# Patient Record
Sex: MALE | Race: BLACK OR AFRICAN AMERICAN | NOT HISPANIC OR LATINO | Employment: OTHER | URBAN - METROPOLITAN AREA
[De-identification: names, ages, dates, MRNs, and addresses within clinical notes are randomized per-mention and may not be internally consistent; named-entity substitution may affect disease eponyms.]

---

## 2017-01-11 ENCOUNTER — HOSPITAL ENCOUNTER (EMERGENCY)
Facility: MEDICAL CENTER | Age: 62
End: 2017-01-12
Attending: EMERGENCY MEDICINE
Payer: MEDICAID

## 2017-01-11 DIAGNOSIS — F29 PSYCHOSIS, UNSPECIFIED PSYCHOSIS TYPE (HCC): ICD-10-CM

## 2017-01-11 DIAGNOSIS — F15.10 METHAMPHETAMINE ABUSE (HCC): ICD-10-CM

## 2017-01-11 LAB
AMPHETAMINES UR QL: POSITIVE
BARBITURATES UR QL SCN: NEGATIVE
BENZODIAZ UR QL SCN: NEGATIVE
BZE UR QL SCN: NEGATIVE
ETHANOL BLD-MCNC: 0 G/DL
PCP UR QL SCN: NEGATIVE
UR OPIATES 2659: NEGATIVE
UR THC 2511T: NEGATIVE
UR TRICYCLIC 2660: NEGATIVE

## 2017-01-11 PROCEDURE — 51701 INSERT BLADDER CATHETER: CPT

## 2017-01-11 PROCEDURE — 51702 INSERT TEMP BLADDER CATH: CPT

## 2017-01-11 PROCEDURE — 80305 DRUG TEST PRSMV DIR OPT OBS: CPT

## 2017-01-11 PROCEDURE — 80307 DRUG TEST PRSMV CHEM ANLYZR: CPT

## 2017-01-11 PROCEDURE — 96374 THER/PROPH/DIAG INJ IV PUSH: CPT

## 2017-01-11 PROCEDURE — 99285 EMERGENCY DEPT VISIT HI MDM: CPT

## 2017-01-11 PROCEDURE — 96372 THER/PROPH/DIAG INJ SC/IM: CPT

## 2017-01-11 PROCEDURE — 700111 HCHG RX REV CODE 636 W/ 250 OVERRIDE (IP): Performed by: EMERGENCY MEDICINE

## 2017-01-11 PROCEDURE — 303105 HCHG CATHETER EXTRA

## 2017-01-11 RX ORDER — ZIPRASIDONE HYDROCHLORIDE 20 MG/1
20 CAPSULE ORAL
Status: DISCONTINUED | OUTPATIENT
Start: 2017-01-11 | End: 2017-01-12 | Stop reason: HOSPADM

## 2017-01-11 RX ORDER — HALOPERIDOL 5 MG/ML
10 INJECTION INTRAMUSCULAR ONCE
Status: DISCONTINUED | OUTPATIENT
Start: 2017-01-11 | End: 2017-01-12

## 2017-01-11 RX ORDER — LORAZEPAM 2 MG/ML
2 INJECTION INTRAMUSCULAR ONCE
Status: COMPLETED | OUTPATIENT
Start: 2017-01-11 | End: 2017-01-11

## 2017-01-11 RX ORDER — HALOPERIDOL 5 MG/ML
10 INJECTION INTRAMUSCULAR ONCE
Status: COMPLETED | OUTPATIENT
Start: 2017-01-11 | End: 2017-01-11

## 2017-01-11 RX ORDER — LORAZEPAM 2 MG/ML
2 INJECTION INTRAMUSCULAR ONCE
Status: DISCONTINUED | OUTPATIENT
Start: 2017-01-11 | End: 2017-01-12

## 2017-01-11 RX ADMIN — LORAZEPAM 2 MG: 2 INJECTION INTRAMUSCULAR; INTRAVENOUS at 20:00

## 2017-01-11 RX ADMIN — HALOPERIDOL LACTATE 10 MG: 5 INJECTION, SOLUTION INTRAMUSCULAR at 20:00

## 2017-01-11 NOTE — ED AVS SNAPSHOT
After Visit Summary                                                                                                                Regino Robin   MRN: [id number]    Department:  [facility], Emergency Dept   Date of Visit:  1/11/2017            [facility], Emergency Dept    [address]    [place] [address]    Phone:  [phone]      You were seen by     1. Pratik Jones M.D.    2. Neeru Lilly M.D.    3. Bernabe Mcdaniels D.O.      Your Diagnosis Was     Psychosis, unspecified psychosis type     F29       These are the medications you received during your hospitalization from 01/11/2017 1912 to 01/12/2017 1325     Date/Time Order Dose Route Action    01/11/2017 2000 haloperidol lactate (HALDOL) injection 10 mg 10 mg Intramuscular Given    01/11/2017 2000 lorazepam (ATIVAN) injection 2 mg 2 mg Intravenous Given      Follow-up Information     1. Follow up with [facility], Emergency Dept.    Specialty:  Emergency Medicine    Why:  If symptoms worsen    Contact information    [address] [address] [phone]        2. Schedule an appointment as soon as possible for a visit with Pcp Pt States None.    Specialty:  Family Medicine      Medication Information     Review all of your home medications and newly ordered medications with your primary doctor and/or pharmacist as soon as possible. Follow medication instructions as directed by your doctor and/or pharmacist.     Please keep your complete medication list with you and share with your physician. Update the information when medications are discontinued, doses are changed, or new medications (including over-the-counter products) are added; and carry medication information at all times in the event of emergency situations.               Medication List      Notice     You have not been prescribed any medications.            Procedures and tests performed  during your visit     DIAGNOSTIC ALCOHOL (BA)    UR DRUG SCREEN(SO BANEGAS ONLY)        Discharge Instructions       Psychosis  Psychosis refers to a severe loss of contact with reality. During a psychotic episode, a person is not able to think clearly, and his or her emotions and responses do not match up with what is actually happening. Someone may have false beliefs about what is happening or who they are (delusions). Someone may see, hear, taste, smell, or feel things that are not present (hallucinations).   Psychosis usually occurs with very serious mental health (psychiatric) conditions such as schizophrenia, bipolar disorder, or major depression. It can sometimes also be the result of drug use or certain medical conditions.  SYMPTOMS  Symptoms of a psychotic episode include:  · Delusions, such as:  · Feeling excessive fear or suspicion (paranoia).  · Believing something that is odd, unrealistic, or false, such as having a false belief about being someone else.  · Hallucinations.  · Disorganized thinking, such as thoughts that jump from one to another that do not make sense to others.  · Disorganized speech, such as saying things that do not make sense to others.  · Inappropriate behavior, such as talking to oneself or intruding on unfamiliar people.  DIAGNOSIS  A diagnosis of psychosis is made through an assessment by a health care provider, who will ask questions about thoughts, feelings, behavior, drug use, and medical conditions. The health care provider may also do one or more of the following:  · Physical exam.  · Blood tests.  · Brain imaging, such as a CT scan or MRI.  · Brain wave study (EEG).  The health care provider may make a referral for further evaluation by a mental health professional.  TREATMENT   Treatment depends on the cause of the psychosis. Treatment may include one or more of the following:  · Monitoring and supportive care in the emergency room or hospital.    · Taking medicines  (antipsychotic medicine) to reduce symptoms and to balance chemicals in the brain.  · Treating an underlying medical condition.  · Stopping or reducing drugs that are causing psychosis.  · Therapy and other supportive programs outside of the hospital.  HOME CARE INSTRUCTIONS  · Over-the-counter and prescription medicines should be taken only as told by the health care provider.  · The health care provider should be consulted before over-the-counter medicines, herbs, or supplements are used.  · All follow-up visits should be kept as told by the health care provider. This is important.  · A healthy lifestyle should be maintained. This includes:  · Eating a healthy diet.  · Getting enough sleep.  · Exercising regularly.  · Avoiding alcohol and recreational drugs as told by the health care provider.  SEEK MEDICAL CARE IF:  · Medicines do not seem to be helping.  · The person hears voices telling him or her to do things.  · The person continues to see, smell, or feel things that are not there.  · The person feels extremely fearful and suspicious that someone or something will harm him or her.  · The person feels unable to leave his or her house.  · The person has trouble taking care of himself or herself.  · The person experiences side effects of medicines, such as:  · Changes in sleep patterns.  · Dizziness.  · Weight gain.  · Restlessness.  · Movement changes.  · Muscle spasms.  · Tremors.  SEEK IMMEDIATE MEDICAL CARE IF:  · Serious thoughts occur about self-harm or about hurting others.  · There are serious side effects of medicine, such as:  · Swelling of the face, lips, tongue, or throat.  · Fever, confusion, muscle spasms, or seizures.     This information is not intended to replace advice given to you by your health care provider. Make sure you discuss any questions you have with your health care provider.     Document Released: 06/07/2011 Document Revised: 05/03/2016 Document Reviewed: 12/22/2015  [employer]  Interactive Patient Education ©2016 Elsevier Inc.    Amphetamine Abuse  Meth and other amphetamines over-stimulate the nervous system. This gives you a false feeling of power and confidence. Amphetamines once came in the form of diet pills. This is no longer considered a valid reason to use the drug. More often they are bought as the illegal drug, methamphetamine. It is also known as crank, crystal, speed, or ice. Meth and similar drugs can cause a variety of problems. They can cause severe physical and psychological problems.  SYMPTOMS   · Reduced appetite.  · Dry mouth.  · Erectile dysfunction.  · Headache.  · Fever and sweating.  · Diarrhea or constipation.  · Blurred vision and impaired speech.  · Dizziness, uncontrollable movements or shaking.  · Restlessness.  · Palpitations and irregular heartbeat.  · Anxiety and/or general nervousness.  Long-term problems:  · Convulsions.  · Heart attack.  · Poor skin color.  · A mental state that mimics serious psychiatric illness.  · Emotional instability.  · Aggression.  · Dry or itchy skin.  · Acne.  RISKS AND COMPLICATIONS  Risks associated with needle use and inhaling include:  · Infection.  · Vein damage.  · Overdose.  · Skin abscesses.  · Hepatitis.  · AIDS.  TREATMENT   There are 2 types:   · Short-term medical treatment. This helps to preserve life and prevent or minimize damage from the problems described above.  · Long-term substance abuse treatment. This helps to achieve recovery from drug abuse or addiction.  HOME CARE INSTRUCTIONS   After treatment discharge, it is essential to do the following:  · Follow all instructions from your caregiver very carefully.  · Take all medications as prescribed. If you cannot, contact your caregiver right away.  · Keep all appointments for further evaluation and counseling.  · Do not use drugs, alcohol or any other mind and mood altering drugs unless prescribed by your doctor.  · Do not operate a motor vehicle or machinery  until your caregiver says it is OK.  SEEK IMMEDIATE MEDICAL CARE IF:   · You have a seizure (convulsions).  · You become very shaky or tense.  · You become light headed or faint.  · You notice sudden or gradual weakness on one side of the body or an arm or leg, or are unable to walk.  · You have a sudden, severe headache, blurred vision or high fever.  · You develop chest pain, nausea or vomiting.  If you have any of the above symptoms, DO NOT DRIVE. Have someone else drive you or call your local emergency services (911 in [place] for help.  Document Released: 01/25/2006 Document Revised: 03/11/2013 Document Reviewed: 03/15/2011  [employer]® Patient Information ©2014 [employer].            Patient Information     Patient Information    Following emergency treatment: all patient requiring follow-up care must return either to a private physician or a clinic if your condition worsens before you are able to obtain further medical attention, please return to the emergency room.     Billing Information    At [facility], we work to make the billing process streamlined for our patients.  Our Representatives are here to answer any questions you may have regarding your hospital bill.  If you have insurance coverage and have supplied your insurance information to us, we will submit a claim to your insurer on your behalf.  Should you have any questions regarding your bill, we can be reached online or by phone as follows:  Online: You are able pay your bills online or live chat with our representatives about any billing questions you may have. We are here to help Monday - Friday from 8:00am to 7:30pm and 9:00am - 12:00pm on Saturdays.  Please visit [email or web address]  for more information.   Phone:  [phone] or [phone]    Please note that your emergency physician, surgeon, pathologist, radiologist, anesthesiologist, and other specialists are not employed by [facility] and will  therefore bill separately for their services.  Please contact them directly for any questions concerning their bills at the numbers below:     Emergency Physician Services:  [phone]  [facility]:  [phone]  [facility]:  [phone]  [facility]:  [phone]    1. Your final bill may vary from the amount quoted upon discharge if all procedures are not complete at that time, or if your doctor has additional procedures of which we are not aware. You will receive an additional bill if you return to the Emergency Department at [facility] for suture removal regardless of the facility of which the sutures were placed.     2. Please arrange for settlement of this account at the emergency registration.    3. All self-pay accounts are due in full at the time of treatment.  If you are unable to meet this obligation then payment is expected within 4-5 days.     4. If you have had radiology studies (CT, X-ray, Ultrasound, MRI), you have received a preliminary result during your emergency department visit. Please contact the radiology department [phone] to receive a copy of your final result. Please discuss the Final result with your primary physician or with the follow up physician provided.     Crisis Hotline:  [place] Crisis Hotline:  6-539-PVBELIV or 1-937.471.4833  [place] Crisis Hotline:    1-810.506.1879 or 615-130-6968         ED Discharge Follow Up Questions    1. In order to provide you with very good care, we would like to follow up with a phone call in the next few days.  May we have your permission to contact you?     YES /  NO    2. What is the best phone number to call you? (       )_____-__________    3. What is the best time to call you?      Morning  /  Afternoon  /  Evening                   Patient Signature:  ____________________________________________________________    Date:  ____________________________________________________________

## 2017-01-11 NOTE — ED AVS SNAPSHOT
1/12/2017          Regino Robin  [address] [place] [address]    Dear Regino:    [facility] wants to ensure your discharge home is safe and you or your loved ones have had all your questions answered regarding your care after you leave the hospital.    You may receive a telephone call within two days of your discharge.  This call is to make certain you understand your discharge instructions as well as ensure we provided you with the best care possible during your stay with us.     The call will only last approximately 3-5 minutes and will be done by a nurse.    Once again, we want to ensure your discharge home is safe and that you have a clear understanding of any next steps in your care.  If you have any questions or concerns, please do not hesitate to contact us, we are here for you.  Thank you for choosing [facility] for your healthcare needs.    Sincerely,    Silvio Vicente    [facility]

## 2017-01-11 NOTE — ED AVS SNAPSHOT
[employer] Access Code: [email or web address]  Expires: 1/17/2017 12:09 PM    Your email address is not on file at [employer].  Email Addresses are required for you to sign up for [employer], please contact [phone] to verify your personal information and to provide your email address prior to attempting to register for [employer].    Regino Robin  [address] [place] [address]    [facility] [employer]  A secure, online tool to manage your health information     [employer]’s [employer]® is a secure, online tool that connects you to your personalized health information from the privacy of your home -- day or night - making it very easy for you to manage your healthcare. Once the activation process is completed, you can even access your medical information using the [employer] praneeth, which is available for free in the Apple Praneeth store or Google Play store.     To learn more about [employer], visit [email or web address]    There are two levels of access available (as shown below):   My Chart Features  [facility] Primary Care Doctor [facility]  Specialists [facility]  Urgent  Care Non-[facility] Primary Care Doctor   Email your healthcare team securely and privately 24/7 X X X    Manage appointments: schedule your next appointment; view details of past/upcoming appointments X      Request prescription refills. X      View recent personal medical records, including lab and immunizations X X X X   View health record, including health history, allergies, medications X X X X   Read reports about your outpatient visits, procedures, consult and ER notes X X X X   See your discharge summary, which is a recap of your hospital and/or ER visit that includes your diagnosis, lab results, and care plan X X  X     How to register for [employer]:  Once your e-mail address has been verified, follow the following steps to sign up for [employer].     1. Go to  [email or web address]  2. Click on the Sign Up Now box, which takes you to the New Member Sign Up page. You will need to  provide the following information:  a. Enter your [employer] Access Code exactly as it appears at the top of this page. (You will not need to use this code after you’ve completed the sign-up process. If you do not sign up before the expiration date, you must request a new code.)   b. Enter your date of birth.   c. Enter your home email address.   d. Click Submit, and follow the next screen’s instructions.  3. Create a [employer] ID. This will be your [employer] login ID and cannot be changed, so think of one that is secure and easy to remember.  4. Create a [employer] password. You can change your password at any time.  5. Enter your Password Reset Question and Answer. This can be used at a later time if you forget your password.   6. Enter your e-mail address. This allows you to receive e-mail notifications when new information is available in [employer].  7. Click Sign Up. You can now view your health information.    For assistance activating your [employer] account, call [phone]

## 2017-01-12 VITALS
RESPIRATION RATE: 18 BRPM | WEIGHT: 145 LBS | SYSTOLIC BLOOD PRESSURE: 107 MMHG | TEMPERATURE: 99.2 F | BODY MASS INDEX: 18.12 KG/M2 | OXYGEN SATURATION: 100 % | HEART RATE: 84 BPM | DIASTOLIC BLOOD PRESSURE: 76 MMHG

## 2017-01-12 NOTE — ED PROVIDER NOTES
Pt was signed out to me h/o possible schizophrenia and TBI and methamphetamine abuse.   He was on a busy being psychotic received haldol and ativan     Pending medical clearance, labs pending  utox and BAL pending     Follow up and re-eval is the plan at this time     12:19 AM  Utox + amphetamines.    I went and evaluated the patient he is currently resting in 4 point restraints after being chemically restrained. Per reports he is being very combative and aggressive and was a danger to staff which is why he was placed in 4. restraints. Staff is aware that they will change positions every couple hours and at this time until he metabolizes his amphetamines further we'll continue to watch him and we'll reevaluate his psychosis in the morning. He continues to be psychotic we will consult by skills in the morning.

## 2017-01-12 NOTE — ED NOTES
Med rec complete per patient.  Patient denies taking prescription/ OTC medications.  Patient denies taking antibiotics within last month.  Allergies reviewed.

## 2017-01-12 NOTE — ED NOTES
to bedside [employer] provided VSS D/C instn reviewed F/U clinic prn D/C ambul Stable improved condn

## 2017-01-12 NOTE — ED PROVIDER NOTES
ED Provider Note    CHIEF COMPLAINT  Chief Complaint   Patient presents with   • Psych Eval   • Hallucinations   • Schizophrenia       HPI  Regino Robin is a 61 y.o. male who presents for psychiatric evaluation. Patient was brought in by EMS when he approached the  and was acting confused. Here in the emergency department he standing out in all screaming I don't want to die. He states he is being followed. And he states that people are trying to kill him. He denies psychiatric history although he has been at this hospital before for chronic pain management, neuropathy, and methamphetamine induced psychosis. No further details of history of present illness could be obtained within the constraints of the urgency of the patient's clavicle condition.    REVIEW OF SYSTEMS  See HPI for further details. No fever or chills. No chest pain. No cough or cold symptoms. No vomiting or diarrhea. All other systems are negative    PAST MEDICAL HISTORY  No past medical history on file.    FAMILY HISTORY  No family history on file.    SOCIAL HISTORY  Social History     Social History   • Marital Status: Single     Spouse Name: N/A   • Number of Children: N/A   • Years of Education: N/A     Social History Main Topics   • Smoking status: Current Every Day Smoker   • Smokeless tobacco: Not on file   • Alcohol Use: Yes   • Drug Use: No   • Sexual Activity: Not on file     Other Topics Concern   • Not on file     Social History Narrative       SURGICAL HISTORY  Past Surgical History   Procedure Laterality Date   • Splenectomy     • Other orthopedic surgery       rods in both legs       CURRENT MEDICATIONS  Home Medications     Reviewed by Blu Mcclendon R.N. (Registered Nurse) on 01/11/17 at 1931  Med List Status: Not Addressed    Medication Last Dose Status          Patient Nilesh Taking any Medications                        ALLERGIES  No Known Allergies    PHYSICAL EXAM  VITAL SIGNS: /99 mmHg  Pulse 94  Temp(Src)  37.3 °C (99.2 °F)  Resp 19  Wt 65.772 kg (145 lb)  SpO2 98%  Constitutional: Screening in the hallway. Yelling at staff.  HENT: Normocephalic, Atraumatic, Bilateral external ears normal, Oropharynx moist,   Eyes: PERRLA,   Neck: Normal range of motion, No tenderness, Supple, No stridor.   Cardiovascular: Normal heart rate, Normal rhythm, No murmurs, No rubs, No gallops.   Thorax & Lungs: Normal breath sounds.  Abdomen: Bowel sounds normal, Soft, No tenderness, No masses, No pulsatile masses.    Skin: Warm, Dry, No erythema, No rash.   Extremities: No edema, No tenderness, No cyanosis, No clubbing. Dorsalis pedis pulses 2+ equal bilaterally. Radial pulses 2+ equal bilaterally  Neurologic: Ambulatory. Normal strength and sensation  Psychiatric: Screaming. Floridly psychotic. Now resting comfortably        RADIOLOGY/PROCEDURES  Blood alcohol and urine tox is currently pending    COURSE & MEDICAL DECISION MAKING  Pertinent Labs & Imaging studies reviewed. (See chart for details)  Patient was at risk to both himself and the staff. He was given Haldol and Ativan IM. Disposition is currently pending. It is unclear with a not this is methamphetamine-induced psychosis versus organic mental illness.        FINAL IMPRESSION  1. Psychosis  2.   3.        Electronically signed by: Pratik Jones, 1/11/2017 10:01 PM

## 2017-01-12 NOTE — ED NOTES
"ERP in to assess patient.  Patient states that he wants to remain in 4 points until he \"wakes up a little more\".  "

## 2017-01-12 NOTE — ED PROVIDER NOTES
ED Provider Note    Addendum:    Reevaluated the patient at 12:55 PM. The patient is alert and oriented ×4, denies any suicidal or homicidal ideation, he is eating without difficulty, ambulate without difficulty. I do believe the patient had acute psychotic break secondary to his methamphetamine intoxication. The patient was given strict return precautions and I have DC'd his legal 2000.

## 2017-01-12 NOTE — ED NOTES
2 point restraints removed from patient. Plan of care discussed with pt. Pt appears groggy, mumbled understanding. ER Tech remains at bedside. Breakfast at side. Side rails up x2.

## 2017-01-12 NOTE — ED NOTES
Patient was riding the bus and approached the  stating that he was hearing voices saying that people want to kill him.  Patient is very suspicious of staff, questioning our movements stating that we are trying to kill him.  Patient was reassured but remains very tense.  His personal belongings were removed but the patient refuses to take off his jeans, socks and t-shirt.

## 2017-01-12 NOTE — DISCHARGE INSTRUCTIONS
Psychosis  Psychosis refers to a severe loss of contact with reality. During a psychotic episode, a person is not able to think clearly, and his or her emotions and responses do not match up with what is actually happening. Someone may have false beliefs about what is happening or who they are (delusions). Someone may see, hear, taste, smell, or feel things that are not present (hallucinations).   Psychosis usually occurs with very serious mental health (psychiatric) conditions such as schizophrenia, bipolar disorder, or major depression. It can sometimes also be the result of drug use or certain medical conditions.  SYMPTOMS  Symptoms of a psychotic episode include:  · Delusions, such as:  · Feeling excessive fear or suspicion (paranoia).  · Believing something that is odd, unrealistic, or false, such as having a false belief about being someone else.  · Hallucinations.  · Disorganized thinking, such as thoughts that jump from one to another that do not make sense to others.  · Disorganized speech, such as saying things that do not make sense to others.  · Inappropriate behavior, such as talking to oneself or intruding on unfamiliar people.  DIAGNOSIS  A diagnosis of psychosis is made through an assessment by a health care provider, who will ask questions about thoughts, feelings, behavior, drug use, and medical conditions. The health care provider may also do one or more of the following:  · Physical exam.  · Blood tests.  · Brain imaging, such as a CT scan or MRI.  · Brain wave study (EEG).  The health care provider may make a referral for further evaluation by a mental health professional.  TREATMENT   Treatment depends on the cause of the psychosis. Treatment may include one or more of the following:  · Monitoring and supportive care in the emergency room or hospital.    · Taking medicines (antipsychotic medicine) to reduce symptoms and to balance chemicals in the brain.  · Treating an underlying medical  condition.  · Stopping or reducing drugs that are causing psychosis.  · Therapy and other supportive programs outside of the hospital.  HOME CARE INSTRUCTIONS  · Over-the-counter and prescription medicines should be taken only as told by the health care provider.  · The health care provider should be consulted before over-the-counter medicines, herbs, or supplements are used.  · All follow-up visits should be kept as told by the health care provider. This is important.  · A healthy lifestyle should be maintained. This includes:  · Eating a healthy diet.  · Getting enough sleep.  · Exercising regularly.  · Avoiding alcohol and recreational drugs as told by the health care provider.  SEEK MEDICAL CARE IF:  · Medicines do not seem to be helping.  · The person hears voices telling him or her to do things.  · The person continues to see, smell, or feel things that are not there.  · The person feels extremely fearful and suspicious that someone or something will harm him or her.  · The person feels unable to leave his or her house.  · The person has trouble taking care of himself or herself.  · The person experiences side effects of medicines, such as:  · Changes in sleep patterns.  · Dizziness.  · Weight gain.  · Restlessness.  · Movement changes.  · Muscle spasms.  · Tremors.  SEEK IMMEDIATE MEDICAL CARE IF:  · Serious thoughts occur about self-harm or about hurting others.  · There are serious side effects of medicine, such as:  · Swelling of the face, lips, tongue, or throat.  · Fever, confusion, muscle spasms, or seizures.     This information is not intended to replace advice given to you by your health care provider. Make sure you discuss any questions you have with your health care provider.     Document Released: 06/07/2011 Document Revised: 05/03/2016 Document Reviewed: 12/22/2015  [employer] Interactive Patient Education ©2016 [employer]    Amphetamine Abuse  Meth and other amphetamines over-stimulate the  nervous system. This gives you a false feeling of power and confidence. Amphetamines once came in the form of diet pills. This is no longer considered a valid reason to use the drug. More often they are bought as the illegal drug, methamphetamine. It is also known as crank, crystal, speed, or ice. Meth and similar drugs can cause a variety of problems. They can cause severe physical and psychological problems.  SYMPTOMS   · Reduced appetite.  · Dry mouth.  · Erectile dysfunction.  · Headache.  · Fever and sweating.  · Diarrhea or constipation.  · Blurred vision and impaired speech.  · Dizziness, uncontrollable movements or shaking.  · Restlessness.  · Palpitations and irregular heartbeat.  · Anxiety and/or general nervousness.  Long-term problems:  · Convulsions.  · Heart attack.  · Poor skin color.  · A mental state that mimics serious psychiatric illness.  · Emotional instability.  · Aggression.  · Dry or itchy skin.  · Acne.  RISKS AND COMPLICATIONS  Risks associated with needle use and inhaling include:  · Infection.  · Vein damage.  · Overdose.  · Skin abscesses.  · Hepatitis.  · AIDS.  TREATMENT   There are 2 types:   · Short-term medical treatment. This helps to preserve life and prevent or minimize damage from the problems described above.  · Long-term substance abuse treatment. This helps to achieve recovery from drug abuse or addiction.  HOME CARE INSTRUCTIONS   After treatment discharge, it is essential to do the following:  · Follow all instructions from your caregiver very carefully.  · Take all medications as prescribed. If you cannot, contact your caregiver right away.  · Keep all appointments for further evaluation and counseling.  · Do not use drugs, alcohol or any other mind and mood altering drugs unless prescribed by your doctor.  · Do not operate a motor vehicle or machinery until your caregiver says it is OK.  SEEK IMMEDIATE MEDICAL CARE IF:   · You have a seizure (convulsions).  · You become  very shaky or tense.  · You become light headed or faint.  · You notice sudden or gradual weakness on one side of the body or an arm or leg, or are unable to walk.  · You have a sudden, severe headache, blurred vision or high fever.  · You develop chest pain, nausea or vomiting.  If you have any of the above symptoms, DO NOT DRIVE. Have someone else drive you or call your local emergency services (911 in [place] for help.  Document Released: 01/25/2006 Document Revised: 03/11/2013 Document Reviewed: 03/15/2011  Z[employer]ge® Patient Information ©2014 [employer].

## 2017-01-12 NOTE — ED NOTES
"Patient is yelling out, \"I don't want to die.\" in the hallway.  ERP aware.  Patient to be placed in 4 points and medicated for safety.  "

## 2017-01-12 NOTE — ED NOTES
Patient released out of his left arm and right foot restraint.  He was also provided with clean linens and stated that he remembered thinking that someone wanted to kill him last night.

## 2017-01-12 NOTE — ED NOTES
Pt finished eating. Tolerated without incident. Pt up ambulatory to bathroom, steady on feet with 0 s/s distress noted. Belongings returned to pt. Social Work called to see about any available resources for pt.

## 2017-01-12 NOTE — ED NOTES
Pt c/o of shoulder discomfort.  Restraints adjusted.  Pt provided with another warm blanket and continues to rest comfortably.

## 2017-01-12 NOTE — ED NOTES
"5 ER staff members and 2 security guards placed patient in 4 point restrains.  ERP at bedside to inform patient of the plan of care.  Patient was scared and apologetic stating that he was sorry for swearing at \"the [employer]\".  He states that the \"herlinda is rich and he put a contract out on me\".   "

## 2017-03-06 PROCEDURE — 99283 EMERGENCY DEPT VISIT LOW MDM: CPT

## 2017-03-07 ENCOUNTER — HOSPITAL ENCOUNTER (EMERGENCY)
Facility: MEDICAL CENTER | Age: 62
End: 2017-03-07
Attending: EMERGENCY MEDICINE
Payer: MEDICAID

## 2017-03-07 VITALS
RESPIRATION RATE: 18 BRPM | WEIGHT: 142 LBS | HEIGHT: 74 IN | HEART RATE: 97 BPM | BODY MASS INDEX: 18.22 KG/M2 | OXYGEN SATURATION: 98 % | SYSTOLIC BLOOD PRESSURE: 124 MMHG | TEMPERATURE: 98.4 F | DIASTOLIC BLOOD PRESSURE: 79 MMHG

## 2017-03-07 DIAGNOSIS — J18.9 PNEUMONIA DUE TO INFECTIOUS ORGANISM, UNSPECIFIED LATERALITY, UNSPECIFIED PART OF LUNG: ICD-10-CM

## 2017-03-07 ASSESSMENT — PAIN SCALES - GENERAL: PAINLEVEL_OUTOF10: 0

## 2017-03-07 NOTE — ED AVS SNAPSHOT
Pathbrite Access Code: [email or web address]  Expires: 4/6/2017  5:04 AM    Your email address is not on file at [facility].  Email Addresses are required for you to sign up for Pathbrite, please contact [phone] to verify your personal information and to provide your email address prior to attempting to register for Pathbrite.    Regino Garrettfield  [address] [place] [address]    Appotat  A secure, online tool to manage your health information     [facility]’s Pathbrite® is a secure, online tool that connects you to your personalized health information from the privacy of your home -- day or night - making it very easy for you to manage your healthcare. Once the activation process is completed, you can even access your medical information using the Pathbrite praneeth, which is available for free in the Apple Praneeth store or Google Play store.     To learn more about Pathbrite, visit [email or web address]    There are two levels of access available (as shown below):   My Chart Features  [facility] Primary Care Doctor [facility]  Specialists [facility]  Urgent  Care Non-[facility] Primary Care Doctor   Email your healthcare team securely and privately 24/7 X X X    Manage appointments: schedule your next appointment; view details of past/upcoming appointments X      Request prescription refills. X      View recent personal medical records, including lab and immunizations X X X X   View health record, including health history, allergies, medications X X X X   Read reports about your outpatient visits, procedures, consult and ER notes X X X X   See your discharge summary, which is a recap of your hospital and/or ER visit that includes your diagnosis, lab results, and care plan X X  X     How to register for Appotat:  Once your e-mail address has been verified, follow the following steps to sign up for Appotat.     1. Go to  [email or web address]  2. Click on the Sign Up Now box, which takes you to the New Member Sign Up page. You will need to  provide the following information:  a. Enter your Pllop.it Access Code exactly as it appears at the top of this page. (You will not need to use this code after you’ve completed the sign-up process. If you do not sign up before the expiration date, you must request a new code.)   b. Enter your date of birth.   c. Enter your home email address.   d. Click Submit, and follow the next screen’s instructions.  3. Create a Pllop.it ID. This will be your Pllop.it login ID and cannot be changed, so think of one that is secure and easy to remember.  4. Create a Pllop.it password. You can change your password at any time.  5. Enter your Password Reset Question and Answer. This can be used at a later time if you forget your password.   6. Enter your e-mail address. This allows you to receive e-mail notifications when new information is available in Pllop.it.  7. Click Sign Up. You can now view your health information.    For assistance activating your Pllop.it account, call [phone]

## 2017-03-07 NOTE — ED AVS SNAPSHOT
3/7/2017          Regino Robin  [address] [place] [address]    Dear Regino:    [facility] wants to ensure your discharge home is safe and you or your loved ones have had all your questions answered regarding your care after you leave the hospital.    You may receive a telephone call within two days of your discharge.  This call is to make certain you understand your discharge instructions as well as ensure we provided you with the best care possible during your stay with us.     The call will only last approximately 3-5 minutes and will be done by a nurse.    Once again, we want to ensure your discharge home is safe and that you have a clear understanding of any next steps in your care.  If you have any questions or concerns, please do not hesitate to contact us, we are here for you.  Thank you for choosing [facility] for your healthcare needs.    Sincerely,    Silvio Vicente    [facility]

## 2017-03-07 NOTE — ED PROVIDER NOTES
"ED Provider Note    CHIEF COMPLAINT  Chief Complaint   Patient presents with   • Shortness of Breath   • Homeless       HPI  Regino Robin is a 61 y.o. male who presents to the emergency department with difficulty with breathing. The patient states been sick over the last couple of days. He states he went to [facility] yesterday he was diagnosed with pneumonia as well as a urinary tract infection. He states he is placed on antibiotics that he filled today. He states he continues to have shortness of breath. He does not have any fevers. He does have some generalized malaise. Therefore he presents for repeat examination.    REVIEW OF SYSTEMS  See [facility] for further details. All other systems are negative.     PAST MEDICAL HISTORY  No past medical history on file.    SOCIAL HISTORY  Social History     Social History   • Marital Status: Single     Spouse Name: N/A   • Number of Children: N/A   • Years of Education: N/A     Social History Main Topics   • Smoking status: Current Every Day Smoker   • Smokeless tobacco: Not on file   • Alcohol Use: Yes   • Drug Use: No   • Sexual Activity: Not on file     Other Topics Concern   • Not on file     Social History Narrative           PHYSICAL EXAM  VITAL SIGNS: /79 mmHg  Pulse 97  Temp(Src) 36.9 °C (98.4 °F)  Resp 14  Ht 1.88 m (6' 2.02\")  Wt 64.411 kg (142 lb)  BMI 18.22 kg/m2  SpO2 98%  Constitutional: Well developed, Well nourished, No acute distress, Non-toxic appearance.   HENT: Normocephalic, Atraumatic, tympanic membranes are intact and nonerythematous bilaterally, Oropharynx moist without exudates or erythema, Nose normal.   Eyes: PERRLA, EOMI, Conjunctiva normal.  Neck: Supple without meningismus  Lymphatic: No lymphadenopathy noted.   Cardiovascular: Normal heart rate, Normal rhythm, No murmurs, No rubs, No gallops.   Thorax & Lungs: Normal breath sounds, No respiratory distress, No wheezing, No chest tenderness.   Abdomen: Bowel sounds normal, " Soft, No tenderness, no rebound, no guarding, no distention, No masses, No pulsatile masses.   Skin: Warm, Dry, No erythema, No rash.   Back: No tenderness, No CVA tenderness.   Extremities: Atraumatic with symmetric distal pulses, No edema, No tenderness, No cyanosis, No clubbing.   Neurologic: Alert & oriented x 3, cranial nerves II through XII are intact, Normal motor function, Normal sensory function, No focal deficits noted.   Psychiatric: Affect normal, Judgment normal, Mood normal.       COURSE & MEDICAL DECISION MAKING  Pertinent Labs & Imaging studies reviewed. (See chart for details)  This a 61-year-old male who presents emergency department with difficulty of breathing. The patient has stable vital signs and is been observed for a prolonged period of time. His vital signs continued to be stable. He does not have any apparent respiratory distress and his lungs are clear. He does have antibiotics for the pneumonia and therefore will continue outpatient treatment. I do want the patient to follow-up the [facility] in 48-72 hours for routine health maintenance.    FINAL IMPRESSION  1. Pneumonia       Disposition  The patient will be discharged in stable condition    Electronically signed by: Gt Newell, 3/7/2017 3:23 AM

## 2017-03-07 NOTE — ED NOTES
Pt discharged, reviewed all discharge instructions, denies questions and complaints.  Escorted to lobby.

## 2017-03-07 NOTE — ED NOTES
"Chief Complaint   Patient presents with   • Shortness of Breath   • Homeless     /79 mmHg  Pulse 97  Temp(Src) 36.9 °C (98.4 °F)  Resp 14  Ht 1.88 m (6' 2.02\")  Wt 64.411 kg (142 lb)  BMI 18.22 kg/m2  SpO2 98%    Pt brought into triage by PEGGY, complaints as above. Pt sleeping heavily when called to triage. Reports he was diagnosed with pneumonia and was unable to get into the shelter tonight so he can't stay out in the cold. Denies any other symptoms at this time. VS as above, NAD, encouraged to return to the triage nurse or tech with any new complaints or symptoms.  "

## 2017-03-07 NOTE — ED AVS SNAPSHOT
Home Care Instructions                                                                                                                Regino Robin   MRN: [id number]    Department:  [facility], Emergency Dept   Date of Visit:  3/6/2017            [facility], Emergency Dept    [address]    [place] [address]    Phone:  [phone]      You were seen by     Gt Newell M.D.      Your Diagnosis Was     Pneumonia due to infectious organism, unspecified laterality, unspecified part of lung     J18.9       Follow-up Information     1. Follow up with [facility] In 2 days.    Contact information    [address]  [address]  [place] [address] [phone]        Medication Information     Review all of your home medications and newly ordered medications with your primary doctor and/or pharmacist as soon as possible. Follow medication instructions as directed by your doctor and/or pharmacist.     Please keep your complete medication list with you and share with your physician. Update the information when medications are discontinued, doses are changed, or new medications (including over-the-counter products) are added; and carry medication information at all times in the event of emergency situations.               Medication List      Notice     You have not been prescribed any medications.              Discharge Instructions       Pneumonia, Adult  Pneumonia is an infection of the lungs. It may be caused by a germ (virus or bacteria). Some types of pneumonia can spread easily from person to person. This can happen when you cough or sneeze.  HOME CARE  · If you are losing too much sleep, you may use cough medicine. However, you should try to avoid taking cough medicine. This is because coughing helps remove mucus from your lungs.  · Sleep so you are almost sitting up. This may help you cough less.  · Use a vaporizer or humidifier in your home or bedroom. These things can help  you breathe better.  · If you were given an antibiotic medicine, finish all of it even if you start to feel better.  · If you were told to take an expectorant medicine, use it as told by your doctor. This medicine brings up mucus from your lungs.  · Only take medicine as told by your doctor.  · Do not smoke. If you smoke, your cough may last weeks after your pneumonia gets better.  · Rest as needed.  A shot (vaccine) can help prevent pneumonia. Shots are often suggested for:  · People over 65 years old.  · People on chemotherapy.  · People with long-term (chronic) lung problems.  · People with immune system problems.  GET HELP IF:  · You have a fever.  · You cannot control your cough with medicine at night, and you are losing sleep.  GET HELP RIGHT AWAY IF:   · You have shortness of breath that gets worse.  · You have more chest pain.  · Your sickness gets worse. This is especially true if you are an older adult or have a weak immune system.  · You cough up blood.  · Your pain gets worse and medicines do not help.  · Your symptoms get worse and not better.     This information is not intended to replace advice given to you by your health care provider. Make sure you discuss any questions you have with your health care provider.     Document Released: 06/05/2009 Document Revised: 01/08/2016 Document Reviewed: 04/13/2016  Omn[employer] Interactive Patient Education ©2016 [employer]            Patient Information     Patient Information    Following emergency treatment: all patient requiring follow-up care must return either to a private physician or a clinic if your condition worsens before you are able to obtain further medical attention, please return to the emergency room.     Billing Information    At [facility], we work to make the billing process streamlined for our patients.  Our Representatives are here to answer any questions you may have regarding your hospital bill.  If you have insurance coverage and have  supplied your insurance information to us, we will submit a claim to your insurer on your behalf.  Should you have any questions regarding your bill, we can be reached online or by phone as follows:  Online: You are able pay your bills online or live chat with our representatives about any billing questions you may have. We are here to help Monday - Friday from 8:00am to 7:30pm and 9:00am - 12:00pm on Saturdays.  Please visit [email or web address]  for more information.   Phone:  [phone] or [phone]    Please note that your emergency physician, surgeon, pathologist, radiologist, anesthesiologist, and other specialists are not employed by [facility] and will therefore bill separately for their services.  Please contact them directly for any questions concerning their bills at the numbers below:     Emergency Physician Services:  [phone]  [facility]:  [phone]  [facility]:  [phone]  [facility]:  [phone]    1. Your final bill may vary from the amount quoted upon discharge if all procedures are not complete at that time, or if your doctor has additional procedures of which we are not aware. You will receive an additional bill if you return to the Emergency Department at [facility] for suture removal regardless of the facility of which the sutures were placed.     2. Please arrange for settlement of this account at the emergency registration.    3. All self-pay accounts are due in full at the time of treatment.  If you are unable to meet this obligation then payment is expected within 4-5 days.     4. If you have had radiology studies (CT, X-ray, Ultrasound, MRI), you have received a preliminary result during your emergency department visit. Please contact the radiology department [phone] to receive a copy of your final result. Please discuss the Final result with your primary physician or with the  follow up physician provided.     Crisis Hotline:  [place] Crisis Hotline:  3-542-TYYSBFF or 1-770.361.4523  [place] Crisis Hotline:    1-362.287.9058 or 927-815-6766         ED Discharge Follow Up Questions    1. In order to provide you with very good care, we would like to follow up with a phone call in the next few days.  May we have your permission to contact you?     YES /  NO    2. What is the best phone number to call you? (       )_____-__________    3. What is the best time to call you?      Morning  /  Afternoon  /  Evening                   Patient Signature:  ____________________________________________________________    Date:  ____________________________________________________________

## 2017-03-07 NOTE — DISCHARGE INSTRUCTIONS
Pneumonia, Adult  Pneumonia is an infection of the lungs. It may be caused by a germ (virus or bacteria). Some types of pneumonia can spread easily from person to person. This can happen when you cough or sneeze.  HOME CARE  · If you are losing too much sleep, you may use cough medicine. However, you should try to avoid taking cough medicine. This is because coughing helps remove mucus from your lungs.  · Sleep so you are almost sitting up. This may help you cough less.  · Use a vaporizer or humidifier in your home or bedroom. These things can help you breathe better.  · If you were given an antibiotic medicine, finish all of it even if you start to feel better.  · If you were told to take an expectorant medicine, use it as told by your doctor. This medicine brings up mucus from your lungs.  · Only take medicine as told by your doctor.  · Do not smoke. If you smoke, your cough may last weeks after your pneumonia gets better.  · Rest as needed.  A shot (vaccine) can help prevent pneumonia. Shots are often suggested for:  · People over 65 years old.  · People on chemotherapy.  · People with long-term (chronic) lung problems.  · People with immune system problems.  GET HELP IF:  · You have a fever.  · You cannot control your cough with medicine at night, and you are losing sleep.  GET HELP RIGHT AWAY IF:   · You have shortness of breath that gets worse.  · You have more chest pain.  · Your sickness gets worse. This is especially true if you are an older adult or have a weak immune system.  · You cough up blood.  · Your pain gets worse and medicines do not help.  · Your symptoms get worse and not better.     This information is not intended to replace advice given to you by your health care provider. Make sure you discuss any questions you have with your health care provider.     Document Released: 06/05/2009 Document Revised: 01/08/2016 Document Reviewed: 04/13/2016  Elsevier Interactive Patient Education ©2016  Elsevier Inc.

## 2017-03-18 ENCOUNTER — HOSPITAL ENCOUNTER (EMERGENCY)
Facility: MEDICAL CENTER | Age: 62
End: 2017-03-18
Attending: EMERGENCY MEDICINE
Payer: MEDICAID

## 2017-03-18 VITALS
HEART RATE: 61 BPM | BODY MASS INDEX: 18.03 KG/M2 | RESPIRATION RATE: 18 BRPM | HEIGHT: 75 IN | DIASTOLIC BLOOD PRESSURE: 65 MMHG | SYSTOLIC BLOOD PRESSURE: 129 MMHG | WEIGHT: 145 LBS

## 2017-03-18 DIAGNOSIS — F15.10 METHAMPHETAMINE ABUSE (HCC): ICD-10-CM

## 2017-03-18 LAB
AMPHET UR QL SCN: POSITIVE
BARBITURATES UR QL SCN: NEGATIVE
BENZODIAZ UR QL SCN: NEGATIVE
BZE UR QL SCN: NEGATIVE
CANNABINOIDS UR QL SCN: POSITIVE
MDMA UR QL SCN: NEGATIVE
METHADONE UR QL SCN: NEGATIVE
OPIATES UR QL SCN: NEGATIVE
OXYCODONE UR QL SCN: NEGATIVE
PCP UR QL SCN: NEGATIVE
POC BREATHALIZER: 0 PERCENT (ref 0–0.01)
PROPOXYPH UR QL SCN: NEGATIVE

## 2017-03-18 PROCEDURE — 99284 EMERGENCY DEPT VISIT MOD MDM: CPT

## 2017-03-18 PROCEDURE — A9270 NON-COVERED ITEM OR SERVICE: HCPCS | Performed by: EMERGENCY MEDICINE

## 2017-03-18 PROCEDURE — 700102 HCHG RX REV CODE 250 W/ 637 OVERRIDE(OP): Performed by: EMERGENCY MEDICINE

## 2017-03-18 PROCEDURE — 302970 POC BREATHALIZER: Performed by: EMERGENCY MEDICINE

## 2017-03-18 PROCEDURE — 90791 PSYCH DIAGNOSTIC EVALUATION: CPT

## 2017-03-18 PROCEDURE — 80307 DRUG TEST PRSMV CHEM ANLYZR: CPT

## 2017-03-18 RX ORDER — LORAZEPAM 1 MG/1
2 TABLET ORAL ONCE
Status: DISCONTINUED | OUTPATIENT
Start: 2017-03-18 | End: 2017-03-18

## 2017-03-18 RX ORDER — LORAZEPAM 1 MG/1
2 TABLET ORAL ONCE
Status: COMPLETED | OUTPATIENT
Start: 2017-03-18 | End: 2017-03-18

## 2017-03-18 RX ADMIN — LORAZEPAM 2 MG: 1 TABLET ORAL at 06:30

## 2017-03-18 NOTE — ED AVS SNAPSHOT
Home Care Instructions                                                                                                                Regino Robin   MRN: [id number]    Department:  [facility], Emergency Dept   Date of Visit:  3/18/2017            [facility], Emergency Dept    [address] [address]    Phone:  [phone]      You were seen by     1. Bernabe Mcdaniels D.O.    2. Guillermo Kate M.D.      Your Diagnosis Was     Methamphetamine abuse     F15.10       These are the medications you received during your hospitalization from 03/18/2017 0532 to 03/18/2017 1558     Date/Time Order Dose Route Action    03/18/2017 0630 lorazepam (ATIVAN) tablet 2 mg 2 mg Oral Given      Follow-up Information     1. Follow up with [facility], Emergency Dept In 1 day.    Specialty:  Emergency Medicine    Why:  As needed, If symptoms worsen    Contact information    [address] [place] [id number] [phone]      Medication Information     Review all of your home medications and newly ordered medications with your primary doctor and/or pharmacist as soon as possible. Follow medication instructions as directed by your doctor and/or pharmacist.     Please keep your complete medication list with you and share with your physician. Update the information when medications are discontinued, doses are changed, or new medications (including over-the-counter products) are added; and carry medication information at all times in the event of emergency situations.               Medication List      Notice     You have not been prescribed any medications.            Procedures and tests performed during your visit     POC BREATHALIZER    URINE DRUG SCREEN        Discharge Instructions       Amphetamine Abuse  Meth and other amphetamines over-stimulate the nervous system. This gives you a false feeling of power and confidence. Amphetamines once came in  the form of diet pills. This is no longer considered a valid reason to use the drug. More often they are bought as the illegal drug, methamphetamine. It is also known as crank, crystal, speed, or ice. Meth and similar drugs can cause a variety of problems. They can cause severe physical and psychological problems.  SYMPTOMS   · Reduced appetite.  · Dry mouth.  · Erectile dysfunction.  · Headache.  · Fever and sweating.  · Diarrhea or constipation.  · Blurred vision and impaired speech.  · Dizziness, uncontrollable movements or shaking.  · Restlessness.  · Palpitations and irregular heartbeat.  · Anxiety and/or general nervousness.  Long-term problems:  · Convulsions.  · Heart attack.  · Poor skin color.  · A mental state that mimics serious psychiatric illness.  · Emotional instability.  · Aggression.  · Dry or itchy skin.  · Acne.  RISKS AND COMPLICATIONS  Risks associated with needle use and inhaling include:  · Infection.  · Vein damage.  · Overdose.  · Skin abscesses.  · Hepatitis.  · AIDS.  TREATMENT   There are 2 types:   · Short-term medical treatment. This helps to preserve life and prevent or minimize damage from the problems described above.  · Long-term substance abuse treatment. This helps to achieve recovery from drug abuse or addiction.  HOME CARE INSTRUCTIONS   After treatment discharge, it is essential to do the following:  · Follow all instructions from your caregiver very carefully.  · Take all medications as prescribed. If you cannot, contact your caregiver right away.  · Keep all appointments for further evaluation and counseling.  · Do not use drugs, alcohol or any other mind and mood altering drugs unless prescribed by your doctor.  · Do not operate a motor vehicle or machinery until your caregiver says it is OK.  SEEK IMMEDIATE MEDICAL CARE IF:   · You have a seizure (convulsions).  · You become very shaky or tense.  · You become light headed or faint.  · You notice sudden or gradual weakness  on one side of the body or an arm or leg, or are unable to walk.  · You have a sudden, severe headache, blurred vision or high fever.  · You develop chest pain, nausea or vomiting.  If you have any of the above symptoms, DO NOT DRIVE. Have someone else drive you or call your local emergency services (911 in [place] for help.  Document Released: 01/25/2006 Document Revised: 03/11/2013 Document Reviewed: 03/15/2011  ExitCare® Patient Information ©2014 [employer].            Patient Information     Patient Information    Following emergency treatment: all patient requiring follow-up care must return either to a private physician or a clinic if your condition worsens before you are able to obtain further medical attention, please return to the emergency room.     Billing Information    At [facility], we work to make the billing process streamlined for our patients.  Our Representatives are here to answer any questions you may have regarding your hospital bill.  If you have insurance coverage and have supplied your insurance information to us, we will submit a claim to your insurer on your behalf.  Should you have any questions regarding your bill, we can be reached online or by phone as follows:  Online: You are able pay your bills online or live chat with our representatives about any billing questions you may have. We are here to help Monday - Friday from 8:00am to 7:30pm and 9:00am - 12:00pm on Saturdays.  Please visit [email or web address]  for more information.   Phone:  [phone] or [phone]    Please note that your emergency physician, surgeon, pathologist, radiologist, anesthesiologist, and other specialists are not employed by [facility] and will therefore bill separately for their services.  Please contact them directly for any questions concerning their bills at the numbers below:     Emergency Physician Services:  [phone]  [facility]:   [phone]  [facility]:  [phone]  [facility]:  [phone]    1. Your final bill may vary from the amount quoted upon discharge if all procedures are not complete at that time, or if your doctor has additional procedures of which we are not aware. You will receive an additional bill if you return to the Emergency Department at [facility] for suture removal regardless of the facility of which the sutures were placed.     2. Please arrange for settlement of this account at the emergency registration.    3. All self-pay accounts are due in full at the time of treatment.  If you are unable to meet this obligation then payment is expected within 4-5 days.     4. If you have had radiology studies (CT, X-ray, Ultrasound, MRI), you have received a preliminary result during your emergency department visit. Please contact the radiology department [phone] to receive a copy of your final result. Please discuss the Final result with your primary physician or with the follow up physician provided.     Crisis Hotline:  [place] Crisis Hotline:  6-255-KBLDQSG or 1-442.707.5537  [place] Crisis Hotline:    1-157.814.3034 or 221-661-0105         ED Discharge Follow Up Questions    1. In order to provide you with very good care, we would like to follow up with a phone call in the next few days.  May we have your permission to contact you?     YES /  NO    2. What is the best phone number to call you? (       )_____-__________    3. What is the best time to call you?      Morning  /  Afternoon  /  Evening                   Patient Signature:  ____________________________________________________________    Date:  ____________________________________________________________

## 2017-03-18 NOTE — ED PROVIDER NOTES
ED Provider Note    I was asked to reevaluate the patient after his amphetamine intoxication wore off. I skills evaluated the patient. And I agree that he has somewhat of an odd affect, slightly pressured speech and flight of ideas but he is alert and oriented ×4. He is directable. He admits to using amphetamines yesterday. I specifically asked him if he had intrusive thoughts of hurting somebody else or himself, if he is having auditory command hallucinations and he denies this. I spent an additional 5 minutes talking to him and he was comfortable and directable and I did not feel I had legal grounds to place him on an involuntary hold at this time and he will be discharged

## 2017-03-18 NOTE — DISCHARGE INSTRUCTIONS
Amphetamine Abuse  Meth and other amphetamines over-stimulate the nervous system. This gives you a false feeling of power and confidence. Amphetamines once came in the form of diet pills. This is no longer considered a valid reason to use the drug. More often they are bought as the illegal drug, methamphetamine. It is also known as crank, crystal, speed, or ice. Meth and similar drugs can cause a variety of problems. They can cause severe physical and psychological problems.  SYMPTOMS   · Reduced appetite.  · Dry mouth.  · Erectile dysfunction.  · Headache.  · Fever and sweating.  · Diarrhea or constipation.  · Blurred vision and impaired speech.  · Dizziness, uncontrollable movements or shaking.  · Restlessness.  · Palpitations and irregular heartbeat.  · Anxiety and/or general nervousness.  Long-term problems:  · Convulsions.  · Heart attack.  · Poor skin color.  · A mental state that mimics serious psychiatric illness.  · Emotional instability.  · Aggression.  · Dry or itchy skin.  · Acne.  RISKS AND COMPLICATIONS  Risks associated with needle use and inhaling include:  · Infection.  · Vein damage.  · Overdose.  · Skin abscesses.  · Hepatitis.  · AIDS.  TREATMENT   There are 2 types:   · Short-term medical treatment. This helps to preserve life and prevent or minimize damage from the problems described above.  · Long-term substance abuse treatment. This helps to achieve recovery from drug abuse or addiction.  HOME CARE INSTRUCTIONS   After treatment discharge, it is essential to do the following:  · Follow all instructions from your caregiver very carefully.  · Take all medications as prescribed. If you cannot, contact your caregiver right away.  · Keep all appointments for further evaluation and counseling.  · Do not use drugs, alcohol or any other mind and mood altering drugs unless prescribed by your doctor.  · Do not operate a motor vehicle or machinery until your caregiver says it is OK.  SEEK IMMEDIATE  MEDICAL CARE IF:   · You have a seizure (convulsions).  · You become very shaky or tense.  · You become light headed or faint.  · You notice sudden or gradual weakness on one side of the body or an arm or leg, or are unable to walk.  · You have a sudden, severe headache, blurred vision or high fever.  · You develop chest pain, nausea or vomiting.  If you have any of the above symptoms, DO NOT DRIVE. Have someone else drive you or call your local emergency services (911 in [place] for help.  Document Released: 01/25/2006 Document Revised: 03/11/2013 Document Reviewed: 03/15/2011  [employer]® Patient Information ©2014 [employer].

## 2017-03-18 NOTE — ED AVS SNAPSHOT
naaya Access Code: [email or web address]  Expires: 4/6/2017  6:04 AM    Your email address is not on file at [facility].  Email Addresses are required for you to sign up for naaya, please contact [phone] to verify your personal information and to provide your email address prior to attempting to register for naaya.    Regino Garrettfield  [address] [place] [address]    [employer]  A secure, online tool to manage your health information     [facility]’s naaya® is a secure, online tool that connects you to your personalized health information from the privacy of your home -- day or night - making it very easy for you to manage your healthcare. Once the activation process is completed, you can even access your medical information using the naaya praneeth, which is available for free in the Apple Praneeth store or Google Play store.     To learn more about naaya, visit [email or web address]    There are two levels of access available (as shown below):   My Chart Features  [facility] Primary Care Doctor [facility]  Specialists [facility]  Urgent  Care Non-[facility] Primary Care Doctor   Email your healthcare team securely and privately 24/7 X X X    Manage appointments: schedule your next appointment; view details of past/upcoming appointments X      Request prescription refills. X      View recent personal medical records, including lab and immunizations X X X X   View health record, including health history, allergies, medications X X X X   Read reports about your outpatient visits, procedures, consult and ER notes X X X X   See your discharge summary, which is a recap of your hospital and/or ER visit that includes your diagnosis, lab results, and care plan X X  X     How to register for [employer]:  Once your e-mail address has been verified, follow the following steps to sign up for [employer].     1. Go to  [email or web address]  2. Click on the Sign Up Now box, which takes you to the New Member Sign Up page. You will need to  provide the following information:  a. Enter your [facility] Access Code exactly as it appears at the top of this page. (You will not need to use this code after you’ve completed the sign-up process. If you do not sign up before the expiration date, you must request a new code.)   b. Enter your date of birth.   c. Enter your home email address.   d. Click Submit, and follow the next screen’s instructions.  3. Create a [employer] ID. This will be your [employer] login ID and cannot be changed, so think of one that is secure and easy to remember.  4. Create a [employer] password. You can change your password at any time.  5. Enter your Password Reset Question and Answer. This can be used at a later time if you forget your password.   6. Enter your e-mail address. This allows you to receive e-mail notifications when new information is available in [employer].  7. Click Sign Up. You can now view your health information.    For assistance activating your [employer] account, call [phone]

## 2017-03-18 NOTE — ED PROVIDER NOTES
"ED Provider Note    Scribed for Bernabe Mcdaniels D.O. by Dave Simeon. 3/18/2017  6:03 AM    Primary care provider: Pcp Pt States None  Means of arrival: ambulance  History obtained from: patient  History limited by: Poor historian    CHIEF COMPLAINT  Chief Complaint   Patient presents with   • ALOC       HPI  Regino Robin is a 61 y.o. Male, with a history of paranoid schizophrenia, who presents to the Emergency Department, complaining of \"feeling that people are trying to kill him\". He has not been complaint with his medications. He denies suicidal or homicidal ideation. Patient confirms pain in his bilateral legs, stating the he broke his legs. He does not recall how he broke them. He denies fever or cough. He reports the he never drinks. Patient reports that he was evaulation for pneumonia.     ROS limited secondary to poor historian.    REVIEW OF SYSTEMS  Pertinent positives include pain in bilaterally legs. Pertinent negatives include no fever, cough. E   HPI limited secondary to poor historian.    PAST MEDICAL HISTORY  Past Medical History   Diagnosis Date   • Schizophrenia (CMS-HCC)        SURGICAL HISTORY  Past Surgical History   Procedure Laterality Date   • Splenectomy     • Other orthopedic surgery       rods in both legs        SOCIAL HISTORY  Social History   Substance Use Topics   • Smoking status: Current Every Day Smoker   • Smokeless tobacco: None   • Alcohol Use: Yes      History   Drug Use No       FAMILY HISTORY  None noted    CURRENT MEDICATIONS  Home Medications     Reviewed by Vivien Jones R.N. (Registered Nurse) on 03/18/17 at 0543  Med List Status: Partial    Medication Last Dose Status          Patient Nilesh Taking any Medications                        ALLERGIES  No Known Allergies    PHYSICAL EXAM  VITAL SIGNS: Ht 1.905 m (6' 3\")  Wt 65.772 kg (145 lb)  BMI 18.12 kg/m2    Nursing notes and vitals reviewed.  Constitutional: Well developed, Well nourished, No acute " distress, Non-toxic appearance.   Eyes: PERRLA, EOMI, Conjunctiva normal, No discharge.   Head: Abrasion to right side of the head.  Cardiovascular: Normal heart rate, Normal rhythm, No murmurs, No rubs, No gallops.   Thorax & Lungs: No respiratory distress, No rales, No rhonchi, No wheezing, No chest tenderness.   Abdomen: Bowel sounds normal, Soft, No tenderness, No guarding, No rebound, No masses, No pulsatile masses.   Skin: Warm, Dry, No erythema, No rash.   Musculoskeletal: Intact distal pulses, No edema, No cyanosis, No clubbing. Good range of motion in all major joints. No tenderness to palpation or major deformities noted, no CVA tenderness, no midline back tenderness.   Neurologic: Alert & oriented x 3, Normal motor function, Normal sensory function, No focal deficits noted.  Psychiatric: Paranoid affect, denies suicidal or homicidal ideation    DIAGNOSTIC STUDIES/PROCEDURES    LABS  Results for orders placed or performed during the hospital encounter of 03/18/17   POC BREATHALIZER   Result Value Ref Range    POC Breathalizer 0.00 0.00 - 0.01 Percent   All labs reviewed by me.    RADIOLOGY  No orders to display     The radiologist's interpretation of all radiological studies have been reviewed by me.    COURSE & MEDICAL DECISION MAKING  Pertinent Labs & Imaging studies reviewed. (See chart for details)    6:03 AM - Patient seen and examined at bedside. Patient will be treated with Ativan 2 mg. Ordered Breathalyzer, Urine Drug Screen, ACCU check to evaluate his symptoms.     This is a charming 61 y.o. male that presents with paranoia, delirium. The patient has not as infection clinically. He is conversant with me. He received Ativan 2 g orally secondary to his agitation. The patient is still waiting for lifeskills evaluation secondary to his psychosis. If deemed necessary, the patient be transferred to a local psychiatric facility for further evaluation and management.      FINAL IMPRESSION  Acute  psychosis  Paranoid schizophrenia     I, Dave Simeon (Scribe), am scribing for, and in the presence of, Bernabe Mcdaniels D.O    Electronically signed by: Dave Simeon (Scribe), 3/18/2017    IBernabe D.O. personally performed the services described in this documentation, as scribed by Dave Simeon in my presence, and it is both accurate and complete.    The note accurately reflects work and decisions made by me.  Bernabe Mcdaniels  3/18/2017  2:04 PM

## 2017-03-18 NOTE — ED AVS SNAPSHOT
3/18/2017          Regino Robin  [address] [place] [address]    Dear Regino:    [facility] wants to ensure your discharge home is safe and you or your loved ones have had all your questions answered regarding your care after you leave the hospital.    You may receive a telephone call within two days of your discharge.  This call is to make certain you understand your discharge instructions as well as ensure we provided you with the best care possible during your stay with us.     The call will only last approximately 3-5 minutes and will be done by a nurse.    Once again, we want to ensure your discharge home is safe and that you have a clear understanding of any next steps in your care.  If you have any questions or concerns, please do not hesitate to contact us, we are here for you.  Thank you for choosing [facility] for your healthcare needs.    Sincerely,    Silvio Vicente    [facility]

## 2017-03-19 NOTE — CONSULTS
[facility]   TRIAGE ASSESSMENT    Name: Regino Robin  MRN: [id number]  : 1955  Age: 61 y.o.  Date of assessment: 3/18/2017  PCP: Pcp Pt States None  Persons in attendance: Patient    CHIEF COMPLAINT/PRESENTING ISSUE (as stated by Regino Robin):   Chief Complaint   Patient presents with   • ALOC        CURRENT LIVING SITUATION/SOCIAL SUPPORT: Shelter    BEHAVIORAL HEALTH TREATMENT HISTORY  Does patient/parent report a history of prior behavioral health treatment for patient?   No:    SAFETY ASSESSMENT - SELF  Does patient acknowledge current or past symptoms of dangerousness to self? no  Does parent/significant other report patient has current or past symptoms of dangerousness to self? N\A  Does presenting problem suggest symptoms of dangerousness to self? No    SAFETY ASSESSMENT - OTHERS  Does patient acknowledge current or past symptoms of aggressive behavior or risk to others? no  Does parent/significant other report patient has current or past symptoms of aggressive behavior or risk to others?  N\A  Does presenting problem suggest symptoms of dangerousness to others? No    Crisis Safety Plan completed and copy given to patient? no    ABUSE/NEGLECT SCREENING  Does patient report feeling “unsafe” in his/her home, or afraid of anyone?  no  Does patient report any history of physical, sexual, or emotional abuse?  no  Does parent or significant other report any of the above? N\A  Is there evidence of neglect by self?  no  Is there evidence of neglect by a caregiver? no  Does the patient/parent report any history of CPS/APS/police involvement related to suspected abuse/neglect or domestic violence? no  Based on the information provided during the current assessment, is a mandated report of suspected abuse/neglect being made?  No    SUBSTANCE USE SCREENING  Yes:  Franky all substances used in the past 30 days:      Last Use Amount   [x]   Alcohol Was vague about all substances; he would only admit to  "meth yesterday    [x]   Marijuana     []   Heroin     []   Prescription Opioids  (used without prescription, for    recreation, or in excess of prescribed amount)     []   Other Prescription  (used without prescription, for    recreation, or in excess of prescribed amount)     []   Cocaine      [x]   Methamphetamine     []   \"\" drugs (ectasy, MDMA)     []   Other substances        UDS results: amphetamine  Breathalyzer results: neg    What consequences does the patient associate with any of the above substance use and or addictive behaviors? None    Risk factors for detox (check all that apply):  []  Seizures   []  Diaphoretic (sweating)   []  Tremors   []  Hallucinations   []  Increased blood pressure   []  Decreased blood pressure   []  Other   []  None      [] Patient education on risk factors for detoxification and instructed to return to ER as needed.      UDS results:   Breathalyzer results:     Risk factors for detox (check all that apply):  [] Seizures   [] Diaphoretic (sweating)   [] Tremors   [] Hallucinations   [] Increased blood pressure   [] Decreased blood pressure   [] Other    [] Patient education on risk factors for detoxification and instructed to return to ER as needed.  MENTAL STATUS   Participation: Active verbal participation and Verbally monopolizing  Grooming: Casual  Orientation: Alert  Behavior: Calm and flirtatious, joking  Eye contact: Good  Mood: Euthymic  Affect: Congruent with content  Thought process: Tangential  Thought content: Evidence of delusion  Speech: Pressured  Perception: Illusions  Memory:  No gross evidence of memory deficits  Insight: Limited  Judgment:  Limited  Other:    Collateral information:   Source:  [] Significant other present in person:   [] Significant other by telephone  [] [facility]   [x] [facility] Staff/Dr. 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 yes    Referral appointment(s) scheduled? no    Alert team only:   Patient admitted to meth use, but was vague about amounts and frequency; he was also vague and would not give specifics to alcohol or THC.  He stated he arrived in [place] about 2 months ago and didn't really like it here.  He tried to be flirtatious and tried to joke about the reason he was in the hospital....\"if you'll just give me another meal, I'll be happy to leave\".  He denied any suicidal or homicidal ideation; he adamantly declined any referrals or for any appointments to be made for him.  I have discussed findings and recommendations with Dr. Medina who is in agreement with these recommendations. a  Referral documentation sent to the following facilities:    Marnie Dietrich R.N.  3/18/2017                "

## 2017-03-20 ENCOUNTER — HOSPITAL ENCOUNTER (EMERGENCY)
Facility: MEDICAL CENTER | Age: 62
End: 2017-03-20
Attending: EMERGENCY MEDICINE
Payer: MEDICAID

## 2017-03-20 ENCOUNTER — APPOINTMENT (OUTPATIENT)
Dept: RADIOLOGY | Facility: MEDICAL CENTER | Age: 62
End: 2017-03-20
Attending: EMERGENCY MEDICINE
Payer: MEDICAID

## 2017-03-20 ENCOUNTER — HOSPITAL ENCOUNTER (EMERGENCY)
Dept: HOSPITAL 8 - ED | Age: 62
Discharge: HOME | End: 2017-03-20
Payer: MEDICAID

## 2017-03-20 VITALS
BODY MASS INDEX: 19.79 KG/M2 | OXYGEN SATURATION: 97 % | HEART RATE: 95 BPM | RESPIRATION RATE: 16 BRPM | TEMPERATURE: 98.6 F | DIASTOLIC BLOOD PRESSURE: 82 MMHG | HEIGHT: 75 IN | SYSTOLIC BLOOD PRESSURE: 128 MMHG | WEIGHT: 159.17 LBS

## 2017-03-20 VITALS — WEIGHT: 143.3 LBS | HEIGHT: 74 IN | BODY MASS INDEX: 18.39 KG/M2

## 2017-03-20 VITALS — DIASTOLIC BLOOD PRESSURE: 79 MMHG | SYSTOLIC BLOOD PRESSURE: 132 MMHG

## 2017-03-20 DIAGNOSIS — R07.81 RIB PAIN: ICD-10-CM

## 2017-03-20 DIAGNOSIS — S20.211A: ICD-10-CM
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
## 2017-03-20 DIAGNOSIS — S00.83XA: Primary | ICD-10-CM

## 2017-03-20 DIAGNOSIS — I10: ICD-10-CM
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
## 2017-03-20 DIAGNOSIS — F20.9: ICD-10-CM
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
## 2017-03-20 DIAGNOSIS — Z87.891: ICD-10-CM

## 2017-03-20 DIAGNOSIS — F29 PSYCHOSIS, UNSPECIFIED PSYCHOSIS TYPE (HCC): ICD-10-CM

## 2017-03-20 LAB — POC BREATHALIZER: 0 PERCENT (ref 0–0.01)

## 2017-03-20 PROCEDURE — 90791 PSYCH DIAGNOSTIC EVALUATION: CPT

## 2017-03-20 PROCEDURE — 70486 CT MAXILLOFACIAL W/O DYE: CPT

## 2017-03-20 PROCEDURE — 99284 EMERGENCY DEPT VISIT MOD MDM: CPT

## 2017-03-20 PROCEDURE — 302970 POC BREATHALIZER: Performed by: EMERGENCY MEDICINE

## 2017-03-20 ASSESSMENT — PAIN SCALES - GENERAL: PAINLEVEL_OUTOF10: 9
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
## 2017-03-21 NOTE — CONSULTS
[facility]  Crisis/Safety Plan    Name:  Regino Robin  MRN:  [id number]  Date:  3/20/2017    Warning signs that a crisis may be developing for me or I may be at risk:  1) increase in anxiety  2) increase in depression  3)feeling overwhelmed    Coping strategies I can use on my own (relaxation, physical activity, etc):  1) listen to music  2) walk  3) follow up with a threapist    Ways I can make my environment safe:  1)keep firearms out of my inviroment  2)secure meds  3)secure sharps    Things I want to tell myself when I feel a crisis developin) stay calm  2) don't do drugs  3)seek help with referrals given; check with [facility]    People I can contact for support or distraction (and their phone numbers):  1) jazzy robin   2)   3)    If I’m not able to reach my support people, or the above strategies don’t help, I can contact the following professionals, agencies, or hotlines:  1) Crisis Call Center ():  [phone] -OR- [phone]  2) Crisis Text Line ():  Text START to 618307  3)   4)     Franky Stearns R.N.

## 2017-03-21 NOTE — ED NOTES
Assumed care of pt, report from EUGENIE Christopher.  Pt was given a box meal.  When moving pt to [facility] for chest x-ray, pt became belligerent and started arguing with this RN.  ERP was notified.  Pt was given several attempts to comply with x-ray, educated that chest x-ray was being ordered due to his complaint of rib pain.  Pt informed it was his choice to have the chest x-ray or not.  Pt continued to refuse chest x-ray.  Pt was given resources from [facility] and a bus pass.  Pt refusing to leave, security escorted pt to bus stop.  Pt was seen and evaluated by [facility] and ERP and cleared of SI.
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
## 2017-05-11 ENCOUNTER — HOSPITAL ENCOUNTER (EMERGENCY)
Facility: MEDICAL CENTER | Age: 62
End: 2017-05-11
Attending: EMERGENCY MEDICINE
Payer: MEDICAID

## 2017-05-11 VITALS
DIASTOLIC BLOOD PRESSURE: 74 MMHG | OXYGEN SATURATION: 94 % | HEIGHT: 75 IN | TEMPERATURE: 98.7 F | SYSTOLIC BLOOD PRESSURE: 128 MMHG | RESPIRATION RATE: 19 BRPM | BODY MASS INDEX: 19.49 KG/M2 | WEIGHT: 156.75 LBS | HEART RATE: 111 BPM

## 2017-05-11 PROCEDURE — 302449 STATCHG TRIAGE ONLY (STATISTIC)

## 2017-05-11 PROCEDURE — 36415 COLL VENOUS BLD VENIPUNCTURE: CPT

## 2017-05-11 ASSESSMENT — LIFESTYLE VARIABLES
HAVE YOU EVER FELT YOU SHOULD CUT DOWN ON YOUR DRINKING: NO
DO YOU DRINK ALCOHOL: YES
EVER FELT BAD OR GUILTY ABOUT YOUR DRINKING: NO
EVER HAD A DRINK FIRST THING IN THE MORNING TO STEADY YOUR NERVES TO GET RID OF A HANGOVER: NO
TOTAL SCORE: 0
TOTAL SCORE: 0
HAVE PEOPLE ANNOYED YOU BY CRITICIZING YOUR DRINKING: NO
CONSUMPTION TOTAL: INCOMPLETE
TOTAL SCORE: 0

## 2017-05-11 NOTE — ED NOTES
"Pt is getting agitated, and short. Pt is beginning to yell in and demand things. Pt states \" This shouldn't take this long, I just want a z pack.\"  "

## 2017-05-11 NOTE — ED NOTES
Pt asked me to remove his IV. Pt stated that he had things to do and he wasn't sticking around pt. Signed out AMA without consult from physician.

## 2017-05-11 NOTE — ED PROVIDER NOTES
"ED Provider Note    Scribed for Moses Quinn M.D. by Frances Daly. 5/11/2017  1:13 PM    Primary care provider: Pcp Pt States None  Means of arrival: walk-in  History obtained from: unable to be obtained  History limited by: patient left AMA    CHIEF COMPLAINT  Chief Complaint   Patient presents with   • Cough       HPI  Regino Robin is a 62 y.o. male who presents to the Emergency Department with a cough onset 6 days per nurses note. I went to examine the patient and he had already left AMA. No further history able to be obtained.    REVIEW OF SYSTEMS  No ROS obtained secondary to patient leaving AMA   See HPI for further details.     PAST MEDICAL HISTORY   has a past medical history of Schizophrenia (CMS-[facility]).    SURGICAL HISTORY   has past surgical history that includes splenectomy and other orthopedic surgery.    SOCIAL HISTORY  Social History   Substance Use Topics   • Smoking status: Current Every Day Smoker   • Alcohol Use: Yes      History   Drug Use No       FAMILY HISTORY  No pertinent family history    CURRENT MEDICATIONS  Home Medications     **Home medications have not yet been reviewed for this encounter**          ALLERGIES  No Known Allergies    PHYSICAL EXAM  VITAL SIGNS: /74 mmHg  Pulse 111  Temp(Src) 37.1 °C (98.7 °F)  Resp 19  Ht 1.905 m (6' 3\")  Wt 71.1 kg (156 lb 12 oz)  BMI 19.59 kg/m2  SpO2 94%    PE not able to be obtained secondary to patient leaving AMA      COURSE & MEDICAL DECISION MAKING  Nursing notes, VS, PMSFHx reviewed in chart.    1:13 PM - I went to examine patient at bedside. He has already left AMA.      DISPOSITION:  Patient left AMA    FINAL IMPRESSION     Patient left AMA before examination.     IFrances (Omer), am scribing for, and in the presence of, Moses Quinn M.D..    Electronically signed by: Frances Daly (Regibe), 5/11/2017    Moses HURTADO M.D. personally performed the services described in this documentation, as scribed by " Frances Daly in my presence, and it is both accurate and complete.    The note accurately reflects work and decisions made by me.  Moses Quinn  5/11/2017  1:22 PM

## 2017-05-11 NOTE — ED NOTES
"Pt [facility] that [facility]. Pt complains that he has a cough that has been present for 6 days. Pt is very hectic an and high energy. 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 Pt is requesting a \"ZPACK\"  "

## 2017-05-12 ENCOUNTER — HOSPITAL ENCOUNTER (EMERGENCY)
Facility: MEDICAL CENTER | Age: 62
End: 2017-05-12
Payer: MEDICAID

## 2017-05-12 VITALS
HEART RATE: 100 BPM | WEIGHT: 153.44 LBS | TEMPERATURE: 97.4 F | SYSTOLIC BLOOD PRESSURE: 135 MMHG | BODY MASS INDEX: 19.18 KG/M2 | DIASTOLIC BLOOD PRESSURE: 72 MMHG | OXYGEN SATURATION: 99 % | RESPIRATION RATE: 14 BRPM

## 2017-05-12 PROCEDURE — 302449 STATCHG TRIAGE ONLY (STATISTIC)

## 2017-05-13 ENCOUNTER — HOSPITAL ENCOUNTER (OUTPATIENT)
Dept: HOSPITAL 8 - ED | Age: 62
Setting detail: OBSERVATION
LOS: 2 days | Discharge: PSYCHIATRIC OR SUBSTANCE USE FACILITY | End: 2017-05-15
Attending: HOSPITALIST | Admitting: HOSPITALIST
Payer: MEDICAID

## 2017-05-13 VITALS — WEIGHT: 176.37 LBS | BODY MASS INDEX: 22.63 KG/M2 | HEIGHT: 74 IN

## 2017-05-13 DIAGNOSIS — F20.9: ICD-10-CM

## 2017-05-13 DIAGNOSIS — D75.89: ICD-10-CM

## 2017-05-13 DIAGNOSIS — F17.210: ICD-10-CM

## 2017-05-13 DIAGNOSIS — R74.0: ICD-10-CM

## 2017-05-13 DIAGNOSIS — I10: ICD-10-CM

## 2017-05-13 DIAGNOSIS — R44.1: ICD-10-CM

## 2017-05-13 DIAGNOSIS — B18.2: ICD-10-CM

## 2017-05-13 DIAGNOSIS — F15.10: ICD-10-CM

## 2017-05-13 DIAGNOSIS — Z90.81: ICD-10-CM

## 2017-05-13 DIAGNOSIS — F23: Primary | ICD-10-CM

## 2017-05-13 LAB
APAP SERPL-MCNC: < 2 MCG/ML (ref 10–30)
AST SERPL-CCNC: 58 U/L (ref 15–37)
BUN SERPL-MCNC: 19 MG/DL (ref 7–18)
DAU SCREEN: (no result)
DIFF TOTAL CELLS COUNTED: (no result)
MACROCYTES BLD QL SMEAR: (no result)
VERIFY COUNTS?: YES

## 2017-05-13 PROCEDURE — 80329 ANALGESICS NON-OPIOID 1 OR 2: CPT

## 2017-05-13 PROCEDURE — 80053 COMPREHEN METABOLIC PANEL: CPT

## 2017-05-13 PROCEDURE — G0480 DRUG TEST DEF 1-7 CLASSES: HCPCS

## 2017-05-13 PROCEDURE — 96372 THER/PROPH/DIAG INJ SC/IM: CPT

## 2017-05-13 PROCEDURE — G0378 HOSPITAL OBSERVATION PER HR: HCPCS

## 2017-05-13 PROCEDURE — 80307 DRUG TEST PRSMV CHEM ANLYZR: CPT

## 2017-05-13 PROCEDURE — 96360 HYDRATION IV INFUSION INIT: CPT

## 2017-05-13 PROCEDURE — 96361 HYDRATE IV INFUSION ADD-ON: CPT

## 2017-05-13 PROCEDURE — 84443 ASSAY THYROID STIM HORMONE: CPT

## 2017-05-13 PROCEDURE — 85025 COMPLETE CBC W/AUTO DIFF WBC: CPT

## 2017-05-13 PROCEDURE — 36415 COLL VENOUS BLD VENIPUNCTURE: CPT

## 2017-05-13 PROCEDURE — 84439 ASSAY OF FREE THYROXINE: CPT

## 2017-05-13 PROCEDURE — 81001 URINALYSIS AUTO W/SCOPE: CPT

## 2017-05-13 PROCEDURE — 82140 ASSAY OF AMMONIA: CPT

## 2017-05-13 PROCEDURE — 99285 EMERGENCY DEPT VISIT HI MDM: CPT

## 2017-05-13 NOTE — ED NOTES
C/o cough productive of yellow, released from intermediate 2 days ago, patient states he does not have a spleen and gets shots for immunity and is way overdo, patient requests [facility] number

## 2017-05-14 VITALS — SYSTOLIC BLOOD PRESSURE: 140 MMHG | DIASTOLIC BLOOD PRESSURE: 85 MMHG

## 2017-05-14 VITALS — SYSTOLIC BLOOD PRESSURE: 124 MMHG | DIASTOLIC BLOOD PRESSURE: 79 MMHG

## 2017-05-14 VITALS — DIASTOLIC BLOOD PRESSURE: 72 MMHG | SYSTOLIC BLOOD PRESSURE: 121 MMHG

## 2017-05-14 LAB
PATH.CAST-FLAG: (no result)
SPERM-FLAG: (no result)
SRC-FLAG: (no result)
XTAL-FLAG: (no result)
YLC-FLAG: (no result)

## 2017-05-15 VITALS — SYSTOLIC BLOOD PRESSURE: 122 MMHG | DIASTOLIC BLOOD PRESSURE: 70 MMHG

## 2017-05-18 ENCOUNTER — HOSPITAL ENCOUNTER (EMERGENCY)
Facility: MEDICAL CENTER | Age: 62
End: 2017-05-18
Attending: EMERGENCY MEDICINE
Payer: MEDICAID

## 2017-05-18 VITALS
SYSTOLIC BLOOD PRESSURE: 115 MMHG | RESPIRATION RATE: 14 BRPM | DIASTOLIC BLOOD PRESSURE: 75 MMHG | TEMPERATURE: 98.4 F | OXYGEN SATURATION: 97 % | HEIGHT: 75 IN | HEART RATE: 105 BPM | BODY MASS INDEX: 19.02 KG/M2 | WEIGHT: 153 LBS

## 2017-05-18 DIAGNOSIS — F25.9 SCHIZOPHRENIA, SCHIZO-AFFECTIVE (HCC): ICD-10-CM

## 2017-05-18 LAB — POC BREATHALIZER: 0.07 PERCENT (ref 0–0.01)

## 2017-05-18 PROCEDURE — 302970 POC BREATHALIZER

## 2017-05-18 PROCEDURE — 99285 EMERGENCY DEPT VISIT HI MDM: CPT

## 2017-05-18 ASSESSMENT — LIFESTYLE VARIABLES
EVER HAD A DRINK FIRST THING IN THE MORNING TO STEADY YOUR NERVES TO GET RID OF A HANGOVER: YES
DOES PATIENT WANT TO STOP DRINKING: YES
TOTAL SCORE: 4
EVER FELT BAD OR GUILTY ABOUT YOUR DRINKING: YES
DO YOU DRINK ALCOHOL: YES
CONSUMPTION TOTAL: INCOMPLETE
DOES PATIENT WANT TO TALK TO SOMEONE ABOUT QUITTING: YES
TOTAL SCORE: 4
TOTAL SCORE: 4
HAVE YOU EVER FELT YOU SHOULD CUT DOWN ON YOUR DRINKING: YES
HAVE PEOPLE ANNOYED YOU BY CRITICIZING YOUR DRINKING: YES

## 2017-05-18 NOTE — ED AVS SNAPSHOT
[employer] Access Code: [email or web address]  Expires: 6/17/2017  3:13 AM    Your email address is not on file at "[employer].  Email Addresses are required for you to sign up for [employer], please contact [phone] to verify your personal information and to provide your email address prior to attempting to register for [employer].    Regino Robin  [address] [place] [address]    [employer]  A secure, online tool to manage your health information     "[employer]’s [employer]® is a secure, online tool that connects you to your personalized health information from the privacy of your home -- day or night - making it very easy for you to manage your healthcare. Once the activation process is completed, you can even access your medical information using the [employer] praneeth, which is available for free in the Apple Praneeth store or Google Play store.     To learn more about [employer], visit [email or web address]    There are two levels of access available (as shown below):   My Chart Features  [facility] Primary Care Doctor [facility]  Specialists [facility]  Urgent  Care Non-[facility] Primary Care Doctor   Email your healthcare team securely and privately 24/7 X X X    Manage appointments: schedule your next appointment; view details of past/upcoming appointments X      Request prescription refills. X      View recent personal medical records, including lab and immunizations X X X X   View health record, including health history, allergies, medications X X X X   Read reports about your outpatient visits, procedures, consult and ER notes X X X X   See your discharge summary, which is a recap of your hospital and/or ER visit that includes your diagnosis, lab results, and care plan X X  X     How to register for [employer]:  Once your e-mail address has been verified, follow the following steps to sign up for [employer].     1. Go to  [email or web address]  2. Click on the Sign Up Now box, which takes you to the New Member Sign Up page. You will need to  provide the following information:  a. Enter your [employer] Access Code exactly as it appears at the top of this page. (You will not need to use this code after you’ve completed the sign-up process. If you do not sign up before the expiration date, you must request a new code.)   b. Enter your date of birth.   c. Enter your home email address.   d. Click Submit, and follow the next screen’s instructions.  3. Create a [employer] ID. This will be your [employer] login ID and cannot be changed, so think of one that is secure and easy to remember.  4. Create a [employer] password. You can change your password at any time.  5. Enter your Password Reset Question and Answer. This can be used at a later time if you forget your password.   6. Enter your e-mail address. This allows you to receive e-mail notifications when new information is available in [employer].  7. Click Sign Up. You can now view your health information.    For assistance activating your [employer] account, call [phone]

## 2017-05-18 NOTE — ED NOTES
Pt refusing to leave still because he states he should have a bus pass or cab voucher. Security called to escort pt out.

## 2017-05-18 NOTE — ED NOTES
Discharge instructions given. All questions answered. Pt to follow up with [facility]. Pt verbalizing understanding. All belongings with pt. Pt ambulated to lobby.

## 2017-05-18 NOTE — ED AVS SNAPSHOT
5/18/2017    Regino Robin  [address] [place] [address]    Dear Regino:    [facility] wants to ensure your discharge home is safe and you or your loved ones have had all of your questions answered regarding your care after you leave the hospital.    Below is a list of resources and contact information should you have any questions regarding your hospital stay, follow-up instructions, or active medical symptoms.    Questions or Concerns Regarding… Contact   Medical Questions Related to Your Discharge  (7 days a week, 8am-5pm) Contact a Nurse Care Coordinator   [phone]   Medical Questions Not Related to Your Discharge  (24 hours a day / 7 days a week)  Contact the Nurse Health Line   [phone]    Medications or Discharge Instructions Refer to your discharge packet   or contact your [facility] Primary Care Provider   [phone]   Follow-up Appointment(s) Schedule your appointment via [employer]   or contact Scheduling [phone]   Billing Review your statement via [employer]  or contact Billing [phone]   Medical Records Review your records via [employer]   or contact Medical Records [phone]     You may receive a telephone call within two days of discharge. This call is to make certain you understand your discharge instructions and have the opportunity to have any questions answered. You can also easily access your medical information, test results and upcoming appointments via the [employer] free online health management tool. You can learn more and sign up at [employer]. For assistance setting up your [employer] account, please call [phone].    Once again, we want to ensure your discharge home is safe and that you have a clear understanding of any next steps in your care. If you have any questions or concerns, please do not hesitate to contact us, we are here for you. Thank you for choosing [facility] for your healthcare needs.    Sincerely,    Your [facility] Healthcare Team

## 2017-05-18 NOTE — ED AVS SNAPSHOT
Home Care Instructions                                                                                                                Regino Robin   MRN: [id number]    Department:  [facility], Emergency Dept   Date of Visit:  5/18/2017            [facility], Emergency Dept    [address] [address]    Phone:  [phone]      You were seen by     Guillermo Quinn M.D.      Your Diagnosis Was     Schizophrenia, schizo-affective (CMS-HCC)     F25.0       Follow-up Information     1. Follow up with [facility] On 5/18/2017.    Why:  please go to [facility] at 0730 for f/u as planned from d/c at [facility].     Contact information    [address] [place] [address]  [phone]      Medication Information     Review all of your home medications and newly ordered medications with your primary doctor and/or pharmacist as soon as possible. Follow medication instructions as directed by your doctor and/or pharmacist.     Please keep your complete medication list with you and share with your physician. Update the information when medications are discontinued, doses are changed, or new medications (including over-the-counter products) are added; and carry medication information at all times in the event of emergency situations.               Medication List      Notice     You have not been prescribed any medications.            Procedures and tests performed during your visit     POC BREATHALIZER        Discharge Instructions       Schizophrenia  Schizophrenia is a mental illness. It may cause disturbed or disorganized thinking, speech, or behavior. People with schizophrenia have problems functioning in one or more areas of life: work, school, home, or relationships. People with schizophrenia are at increased risk for suicide, certain chronic physical illnesses, and unhealthy behaviors, such as smoking and drug use.  People who have family members  with schizophrenia are at higher risk of developing the illness. Schizophrenia affects men and women equally but usually appears at an earlier age (teenage or early adult years) in men.   SYMPTOMS  The earliest symptoms are often subtle (prodrome) and may go unnoticed until the illness becomes more severe (first-break psychosis). Symptoms of schizophrenia may be continuous or may come and go in severity. Episodes often are triggered by major life events, such as family stress, college,  service, marriage, pregnancy or child birth, divorce, or loss of a loved one. People with schizophrenia may see, hear, or feel things that do not exist (hallucinations). They may have false beliefs in spite of obvious proof to the contrary (delusions). Sometimes speech is incoherent or behavior is odd or withdrawn.   DIAGNOSIS  Schizophrenia is diagnosed through an assessment by your caregiver. Your caregiver will ask questions about your thoughts, behavior, mood, and ability to function in daily life. Your caregiver may ask questions about your medical history and use of alcohol or drugs, including prescription medication. Your caregiver may also order blood tests and imaging exams. Certain medical conditions and substances can cause symptoms that resemble schizophrenia. Your caregiver may refer you to a mental health specialist for evaluation. There are three major criterion for a diagnosis of schizophrenia:  · Two or more of the following five symptoms are present for a month or longer:  ¨ Delusions. Often the delusions are that you are being attacked, harassed, cheated, persecuted or conspired against (persecutory delusions).  ¨ Hallucinations.    ¨ Disorganized speech that does not make sense to others.  ¨ Grossly disorganized (confused or unfocused) behavior or extremely overactive or underactive motor activity (catatonia).  ¨ Negative symptoms such as bland or blunted emotions (flat affect), loss of will power  (avolition), and withdrawal from social contacts (social isolation).  · Level of functioning in one or more major areas of life (work, school, relationships, or self-care) is markedly below the level of functioning before the onset of illness.    · There are continuous signs of illness (either mild symptoms or decreased level of functioning) for at least 6 months or longer.  TREATMENT   Schizophrenia is a long-term illness. It is best controlled with continuous treatment rather than treatment only when symptoms occur. The following treatments are used to manage schizophrenia:  · Medication--Medication is the most effective and important form of treatment for schizophrenia. Antipsychotic medications are usually prescribed to help manage schizophrenia. Other types of medication may be added to relieve any symptoms that may occur despite the use of antipsychotic medications.  · Counseling or talk therapy--Individual, group, or family counseling may be helpful in providing education, support, and guidance. Many people with schizophrenia also benefit from social skills and job skills (vocational) training.  A combination of medication and counseling is best for managing the disorder over time. A procedure in which electricity is applied to the brain through the scalp (electroconvulsive therapy) may be used to treat catatonic schizophrenia or schizophrenia in people who cannot take or do not respond to medication and counseling.     This information is not intended to replace advice given to you by your health care provider. Make sure you discuss any questions you have with your health care provider.     Document Released: 12/15/2001 Document Revised: 01/08/2016 Document Reviewed: 03/12/2014  [employer] Interactive Patient Education ©2016 [employer]            Patient Information     Patient Information    Following emergency treatment: all patient requiring follow-up care must return either to a private physician or a  clinic if your condition worsens before you are able to obtain further medical attention, please return to the emergency room.     Billing Information    At [facility], we work to make the billing process streamlined for our patients.  Our Representatives are here to answer any questions you may have regarding your hospital bill.  If you have insurance coverage and have supplied your insurance information to us, we will submit a claim to your insurer on your behalf.  Should you have any questions regarding your bill, we can be reached online or by phone as follows:  Online: You are able pay your bills online or live chat with our representatives about any billing questions you may have. We are here to help Monday - Friday from 8:00am to 7:30pm and 9:00am - 12:00pm on Saturdays.  Please visit [email or web address]  for more information.   Phone:  [phone] or [phone]    Please note that your emergency physician, surgeon, pathologist, radiologist, anesthesiologist, and other specialists are not employed by [facility] and will therefore bill separately for their services.  Please contact them directly for any questions concerning their bills at the numbers below:     Emergency Physician Services:  [phone]  [facility]:  [phone]  [facility]:  [phone]  [facility]:  [phone]    1. Your final bill may vary from the amount quoted upon discharge if all procedures are not complete at that time, or if your doctor has additional procedures of which we are not aware. You will receive an additional bill if you return to the Emergency Department at [facility] for suture removal regardless of the facility of which the sutures were placed.     2. Please arrange for settlement of this account at the emergency registration.    3. All self-pay accounts are due in full at the time of treatment.  If you are unable to meet  this obligation then payment is expected within 4-5 days.     4. If you have had radiology studies (CT, X-ray, Ultrasound, MRI), you have received a preliminary result during your emergency department visit. Please contact the radiology department [phone] to receive a copy of your final result. Please discuss the Final result with your primary physician or with the follow up physician provided.     Crisis Hotline:  [place] Crisis Hotline:  7-282-PXKWGOZ or 1-258.183.1747  [place] Crisis Hotline:    1-542.143.2906 or 336-200-6435         ED Discharge Follow Up Questions    1. In order to provide you with very good care, we would like to follow up with a phone call in the next few days.  May we have your permission to contact you?     YES /  NO    2. What is the best phone number to call you? (       )_____-__________    3. What is the best time to call you?      Morning  /  Afternoon  /  Evening                   Patient Signature:  ____________________________________________________________    Date:  ____________________________________________________________

## 2017-05-18 NOTE — ED PROVIDER NOTES
"ED Provider Note    Scribed for Guillermo Quinn M.D. by Alea Lopez. 5/18/2017, 2:30 AM.    Primary care provider: Patient states none   Means of arrival: Walk-in  History obtained from: Patient  History limited by: None     CHIEF COMPLAINT  Chief Complaint   Patient presents with   • Psych Eval     pt states he wanted to leave [facility] to get clean clothes, states he is hearing voices and \"I am suicidal\" pt states left AMA pt [facility]   • Suicidal Ideation     HPI  Regino Robin is a 62 y.o. male who presents to the Emergency Department for feeling \"psychotic\" getting his errands done this afternoon a few hours after being discharged from [facility]. The patient states that he left [facility] today after the hold was lifted after staying for three days. Per patient he has been taking \"some medications\" for psychosis since his stay at [facility]. The patient reports that he has chronic leg pain. The patient denies any acute homicidial or suicidal ideation. The patient denies any recent alcohol or drug use. Per patient he is homeless and does not have a place to stay after getting discharged from [facility] and does not want to stay on the streets. Per patient he has a follow up appointment at [facility] tomorrow at 7:30 am.     REVIEW OF SYSTEMS  See HPI for further details. All other systems are negative.     PAST MEDICAL HISTORY   has a past medical history of Schizophrenia (CMS-HCC).    SURGICAL HISTORY   has past surgical history that includes splenectomy and other orthopedic surgery.    SOCIAL HISTORY  Social History   Substance Use Topics   • Smoking status: Current Every Day Smoker   • Smokeless tobacco: None   • Alcohol Use: Yes      History   Drug Use No     FAMILY HISTORY  History reviewed. No pertinent family history.    CURRENT MEDICATIONS  Reviewed.  See Encounter Summary.     ALLERGIES  No Known Allergies    PHYSICAL EXAM  VITAL SIGNS: /75 mmHg  " "Pulse 105  Temp(Src) 36.9 °C (98.4 °F)  Resp 14  Ht 1.905 m (6' 3\")  Wt 69.4 kg (153 lb)  BMI 19.12 kg/m2  SpO2 97%  Constitutional: Alert in no apparent distress.  HENT: No signs of trauma, Bilateral external ears normal, Nose normal.   Eyes: Pupils are equal and reactive, Conjunctiva normal, Non-icteric.   Neck: Normal range of motion, No tenderness, Supple, No stridor.   Lymphatic: No lymphadenopathy noted.   Cardiovascular: Regular rate and rhythm, no murmurs.   Thorax & Lungs: Normal breath sounds, No respiratory distress, No wheezing, No chest tenderness.   Abdomen: Bowel sounds normal, Soft, No tenderness, No masses, No pulsatile masses. No peritoneal signs.  Skin: Warm, Dry, No erythema, No rash.   Back: No bony tenderness, No CVA tenderness.   Extremities: Intact distal pulses, No edema, No tenderness, No cyanosis  Musculoskeletal: Good range of motion in all major joints. No tenderness to palpation or major deformities noted.   Neurologic: Alert , Normal motor function, Normal sensory function, No focal deficits noted.   Psychiatric: Affect normal, Judgment normal, Mood normal.     DIAGNOSTIC STUDIES / PROCEDURES     LABS  Results for orders placed or performed during the hospital encounter of 05/18/17   POC BREATHALIZER   Result Value Ref Range    POC Breathalizer 0.07 (A) 0.00 - 0.01 Percent   All labs were reviewed by me.    COURSE & MEDICAL DECISION MAKING  Pertinent Labs & Imaging studies reviewed. (See chart for details)      2:30 AM - Patient seen and examined at bedside. Ordered POC breathalyzer to evaluate his symptoms.     2:36 AM-Life skills states that she feels that the patient is cleared for discharge.     2:44 AM- I explained to the patient that he will be discharged with instructions to follow up at the [facility] clinic as scheduled tomorrow. The patient requested percocet for his chronic leg pain, i explained our narcotic policy. He was informed to return to the emergency department  For " any worsening symptoms. The patient understands and agrees.     Decision Making:  This is a 62 y.o. year old male who presents with complaint of persistent schizophrenia. Patient was just discharged from [facility] today. He has follow-up with [facility] clinic later this morning. I do not believe that he has any real threat to himself or others at this point. Given education and reiteration of clinical improvement over the last week while at [facility] he does admit that he is doing well at this time despite his prior commentary to other staff members. I have had the patient evaluated by life skills as well which is also agreeable with the patient does not appear to have any acute threat to himself or others. He will follow up in [facility] clinic later this morning at 7:30 AM as scheduled. He is however understanding of return precautions if needed or if he is not able to make this appointment.    The patient will return for new or worsening symptoms and is stable at the time of discharge.    The patient is referred to a primary physician for blood pressure management, diabetic screening, and for all other preventative health concerns.    DISPOSITION:  Patient will be discharged home in stable condition.    FOLLOW UP:  [address] [address]  [phone]  On 5/18/2017  please go to [facility] at 0730 for f/u as planned from d/c at [facility].       FINAL IMPRESSION  1. Schizophrenia, schizo-affective (CMS-HCC)        Alea HURTADO (Scribe), am scribing for, and in the presence of, Guillermo Quinn M.D..    Electronically signed by: Alea Lopez (Scribe), 5/18/2017    Guillermo HURTADO M.D. personally performed the services described in this documentation, as scribed by Alea Lopez in my presence, and it is both accurate and complete.    The note accurately reflects work and decisions made by me.  Guillermo Quinn  5/18/2017  5:06 AM

## 2017-05-18 NOTE — DISCHARGE PLANNING
Medical Social Work:  EUGENIE Robles contacted  regarding shelter placement. [facility] shelter contacted and they can take pt. Margaret stated pt could walk, then asked for a cab voucher. Pt. Has Medicaid so MTM will be called if pt wants a ride.

## 2017-05-18 NOTE — ED NOTES
"Attempted to d/c pt but pt is frustrated saying, \"The doctor said I could stay here until my appointment.\" This was confirmed with ERP that pt is to be discharged. Pt states, \"Where am I going to go? I have no home?\" I advised pt that the shelter is an option and is only a mile away. Pt responded, \"Man! I've been on the streets for years they ain;t open.\" SW called fr additional help.   "

## 2017-05-18 NOTE — CONSULTS
"[facility]   TRIAGE ASSESSMENT    Name: Regino Robin  MRN: [id number]  : 1955  Age: 62 y.o.  Date of assessment: 2017  PCP: Adolfo Pt States None  Persons in attendance: Patient    CHIEF COMPLAINT/PRESENTING ISSUE as stated by TACOS Rodriguez RN, patient stating he is suicidal.  He was released from [facility] today.  He is a bit grandiose but very future oriented.  Chronic meth, THC and ETOh use disorder.  Appears to be looking for a place to stay. He has a 730 am appointment at [facility] to establish his medical and mental health care there today.  Says he does not plan to stay in [place] that he will return to [place].  He says he exchanges his food stamp money for cash then he gambles.  He says he has been arrested 270 times all over the country.  He was sentence to 3 years for theft in [place] served 11 months.  He reports he walked in traffic 2011 was in coma and a wheelchair from that attempt.  His plan today was to knock himself in the head with a rock.  But said he won't do that.  Because he has trouble with his legs so he does not need trouble with his head.  No command hallucinations or delusions.  Has been in em after his residential sentence in [place] since 2016.  He said his sister sent him $100 today.  He bought clothes, a good meal and gambled the rest.  Denies access to a gun.  He plans to apply for Social Security since he is 61 yo.    Chief Complaint   Patient presents with   • Psych Eval     pt states he wanted to leave [facility] to get clean clothes, states he is hearing voices and \"I am suicidal\" pt states left AMA pt has an appointment at the [facility] at 0730   • Suicidal Ideation        CURRENT LIVING SITUATION/SOCIAL SUPPORT: Homeless in [place] since released from residential.    BEHAVIORAL HEALTH TREATMENT HISTORY  Does patient/parent report a history of prior behavioral health treatment for patient?   Yes:    Dates Level of Care Facilty/Provider Diagnosis/Problem " Medications   Released today inpt [facility] Bipolar do Has his prescription to get filled from [facility]    Inpt/outpt  [place]                                                                   SAFETY ASSESSMENT - SELF  Does patient acknowledge current or past symptoms of dangerousness to self? Yes, one prior attempt.  Denies today  Does parent/significant other report patient has current or past symptoms of dangerousness to self? N\A  Does presenting problem suggest symptoms of dangerousness to self? No    SAFETY ASSESSMENT - OTHERS  Does patient acknowledge current or past symptoms of aggressive behavior or risk to others? no  Does parent/significant other report patient has current or past symptoms of aggressive behavior or risk to others?  N\A  Does presenting problem suggest symptoms of dangerousness to others? No    Crisis Safety Plan completed and copy given to patient? N\A    ABUSE/NEGLECT SCREENING  Does patient report feeling “unsafe” in his/her home, or afraid of anyone?  no  Does patient report any history of physical, sexual, or emotional abuse?  no  Does parent or significant other report any of the above? N\A  Is there evidence of neglect by self?  no  Is there evidence of neglect by a caregiver? no  Does the patient/parent report any history of CPS/APS/police involvement related to suspected abuse/neglect or domestic violence? no  Based on the information provided during the current assessment, is a mandated report of suspected abuse/neglect being made?  No    SUBSTANCE USE SCREENING  Yes:  Franky all substances used in the past 30 days:      Last Use Amount   [x]   Alcohol today    [x]   Marijuana 1 week    []   Heroin     []   Prescription Opioids  (used without prescription, for    recreation, or in excess of prescribed amount)     []   Other Prescription  (used without prescription, for    recreation, or in excess of prescribed amount)     []   Cocaine      [x]   Methamphetamine 1 week    []   " \"\" drugs (ectasy, MDMA)     []   Other substances        UDS results:   Breathalyzer results: 0.07    What consequences does the patient associate with any of the above substance use and or addictive behaviors? Legal: arrested a lot.    Risk factors for detox (check all that apply): Denies   []  Seizures   []  Diaphoretic (sweating)   []  Tremors   []  Hallucinations   []  Increased blood pressure   []  Decreased blood pressure   []  Other   []  None      [] Patient education on risk factors for detoxification and instructed to return to ER as needed.      MENTAL STATUS   Participation: Verbally monopolizing  Grooming: Casual  Orientation: Alert and Fully Oriented  Behavior: Calm  Eye contact: Good  Mood: Anxious  Affect: Flexible and Full range  Thought process: Logical  Thought content: Within normal limits  Speech: Volume within normal limits  Perception: Within normal limits  Memory:  No gross evidence of memory deficits  Insight: Adequate  Judgment:  Adequate  Other:    Collateral information:   Source:  [] Significant other present in person:   [] Significant other by telephone  [] [facility]   [] [facility] Nursing Staff  [x] [facility] Medical [facility]  [] Other:     [] Unable to complete full assessment due to:  [] Acute intoxication  [] Patient declined to participate/engage  [] Patient verbally unresponsive  [] Significant cognitive deficits  [] Significant perceptual distortions or behavioral disorganization  [] Other:      CLINICAL IMPRESSIONS:  Primary:  Bipolar disorder  Secondary: Polysubstance use disorder, homeless    IDENTIFIED NEEDS/PLAN:  [Trigger DISPOSITION list for any items marked]    []  Imminent safety risk - self [] Imminent safety risk - others   []  Acute substance withdrawl []  Psychosis/Impaired reality testing   [x]  Mood/anxiety [x]  Substance use/Addictive behavior   []  Maladaptive behaviro []  Parent/child conflict   []  Family/Couples conflict []  Biomedical   [x]  " Housing [x]  Financial   []   Legal  Occupational/Educational   []  Domestic violence []  Other:     Disposition: Refer to Community support program: [place], [facility], [place], 12 Step program: AA/NA schedule , [facility], [facility], [facility] and shelter    Does patient express agreement with the above plan? yes    Referral appointment(s) scheduled? N\A    Alert team only:   I have discussed findings and recommendations with Dr. Quinn who is in agreement with these recommendations.   No legal hold needed.  Patient is future-oriented.  Just released from [facility] today.  No legal hold needed.    Referral documentation sent to the following facilities:  Resource list provided for support, counseling and sobriety services.    Yesenia Billy R.N.  5/18/2017

## 2017-05-18 NOTE — DISCHARGE INSTRUCTIONS
Schizophrenia  Schizophrenia is a mental illness. It may cause disturbed or disorganized thinking, speech, or behavior. People with schizophrenia have problems functioning in one or more areas of life: work, school, home, or relationships. People with schizophrenia are at increased risk for suicide, certain chronic physical illnesses, and unhealthy behaviors, such as smoking and drug use.  People who have family members with schizophrenia are at higher risk of developing the illness. Schizophrenia affects men and women equally but usually appears at an earlier age (teenage or early adult years) in men.   SYMPTOMS  The earliest symptoms are often subtle (prodrome) and may go unnoticed until the illness becomes more severe (first-break psychosis). Symptoms of schizophrenia may be continuous or may come and go in severity. Episodes often are triggered by major life events, such as family stress, college,  service, marriage, pregnancy or child birth, divorce, or loss of a loved one. People with schizophrenia may see, hear, or feel things that do not exist (hallucinations). They may have false beliefs in spite of obvious proof to the contrary (delusions). Sometimes speech is incoherent or behavior is odd or withdrawn.   DIAGNOSIS  Schizophrenia is diagnosed through an assessment by your caregiver. Your caregiver will ask questions about your thoughts, behavior, mood, and ability to function in daily life. Your caregiver may ask questions about your medical history and use of alcohol or drugs, including prescription medication. Your caregiver may also order blood tests and imaging exams. Certain medical conditions and substances can cause symptoms that resemble schizophrenia. Your caregiver may refer you to a mental health specialist for evaluation. There are three major criterion for a diagnosis of schizophrenia:  · Two or more of the following five symptoms are present for a month or longer:  ¨ Delusions. Often  the delusions are that you are being attacked, harassed, cheated, persecuted or conspired against (persecutory delusions).  ¨ Hallucinations.    ¨ Disorganized speech that does not make sense to others.  ¨ Grossly disorganized (confused or unfocused) behavior or extremely overactive or underactive motor activity (catatonia).  ¨ Negative symptoms such as bland or blunted emotions (flat affect), loss of will power (avolition), and withdrawal from social contacts (social isolation).  · Level of functioning in one or more major areas of life (work, school, relationships, or self-care) is markedly below the level of functioning before the onset of illness.    · There are continuous signs of illness (either mild symptoms or decreased level of functioning) for at least 6 months or longer.  TREATMENT   Schizophrenia is a long-term illness. It is best controlled with continuous treatment rather than treatment only when symptoms occur. The following treatments are used to manage schizophrenia:  · Medication--Medication is the most effective and important form of treatment for schizophrenia. Antipsychotic medications are usually prescribed to help manage schizophrenia. Other types of medication may be added to relieve any symptoms that may occur despite the use of antipsychotic medications.  · Counseling or talk therapy--Individual, group, or family counseling may be helpful in providing education, support, and guidance. Many people with schizophrenia also benefit from social skills and job skills (vocational) training.  A combination of medication and counseling is best for managing the disorder over time. A procedure in which electricity is applied to the brain through the scalp (electroconvulsive therapy) may be used to treat catatonic schizophrenia or schizophrenia in people who cannot take or do not respond to medication and counseling.     This information is not intended to replace advice given to you by your health  care provider. Make sure you discuss any questions you have with your health care provider.     Document Released: 12/15/2001 Document Revised: 01/08/2016 Document Reviewed: 03/12/2014  [employer] Interactive Patient Education ©2016 [employer] Inc.

## 2017-05-18 NOTE — ED NOTES
".  Chief Complaint   Patient presents with   • Psych Eval     pt states he wanted to leave [place] to get clean clothes, states he is hearing voices and \"I am suicidal\" pt states left AMA pt has an appointment at the [facility] at 0730   • Suicidal Ideation     Pt moved to room 37, report to Margaret TRAORE, pt has flight of ideas while in triage  "

## 2017-05-27 ENCOUNTER — HOSPITAL ENCOUNTER (EMERGENCY)
Dept: HOSPITAL 8 - ED | Age: 62
Discharge: LEFT BEFORE DISCHARGE | End: 2017-05-27
Payer: MEDICAID

## 2017-05-27 VITALS — DIASTOLIC BLOOD PRESSURE: 87 MMHG | SYSTOLIC BLOOD PRESSURE: 133 MMHG

## 2017-05-27 VITALS — HEIGHT: 75 IN | BODY MASS INDEX: 19.19 KG/M2 | WEIGHT: 154.32 LBS

## 2017-05-27 DIAGNOSIS — R63.4: ICD-10-CM

## 2017-05-27 DIAGNOSIS — I10: ICD-10-CM

## 2017-05-27 DIAGNOSIS — E87.6: ICD-10-CM

## 2017-05-27 DIAGNOSIS — R05: Primary | ICD-10-CM

## 2017-05-27 PROCEDURE — 99281 EMR DPT VST MAYX REQ PHY/QHP: CPT

## 2017-10-14 ENCOUNTER — HOSPITAL ENCOUNTER (EMERGENCY)
Dept: HOSPITAL 8 - ED | Age: 62
End: 2017-10-14
Payer: MEDICAID

## 2017-10-14 ENCOUNTER — HOSPITAL ENCOUNTER (EMERGENCY)
Dept: HOSPITAL 8 - ED | Age: 62
LOS: 1 days | Discharge: LEFT BEFORE DISCHARGE | End: 2017-10-15
Payer: MEDICAID

## 2017-10-14 VITALS — DIASTOLIC BLOOD PRESSURE: 87 MMHG | SYSTOLIC BLOOD PRESSURE: 133 MMHG

## 2017-10-14 VITALS — BODY MASS INDEX: 22.72 KG/M2 | WEIGHT: 158.73 LBS | HEIGHT: 70 IN

## 2017-10-14 VITALS — WEIGHT: 159.17 LBS | BODY MASS INDEX: 19.79 KG/M2 | HEIGHT: 75 IN

## 2017-10-14 DIAGNOSIS — J44.1: Primary | ICD-10-CM

## 2017-10-14 DIAGNOSIS — I10: ICD-10-CM

## 2017-10-14 DIAGNOSIS — R41.82: ICD-10-CM

## 2017-10-14 DIAGNOSIS — J15.9: ICD-10-CM

## 2017-10-14 DIAGNOSIS — F15.10: ICD-10-CM

## 2017-10-14 DIAGNOSIS — J44.9: ICD-10-CM

## 2017-10-14 DIAGNOSIS — Z00.00: Primary | ICD-10-CM

## 2017-10-14 PROCEDURE — 96361 HYDRATE IV INFUSION ADD-ON: CPT

## 2017-10-14 PROCEDURE — 96360 HYDRATION IV INFUSION INIT: CPT

## 2017-10-14 PROCEDURE — 96372 THER/PROPH/DIAG INJ SC/IM: CPT

## 2017-10-14 PROCEDURE — 71020: CPT

## 2017-10-14 PROCEDURE — 99284 EMERGENCY DEPT VISIT MOD MDM: CPT

## 2017-10-14 PROCEDURE — 99285 EMERGENCY DEPT VISIT HI MDM: CPT

## 2017-10-14 PROCEDURE — 93005 ELECTROCARDIOGRAM TRACING: CPT

## 2017-10-15 ENCOUNTER — HOSPITAL ENCOUNTER (EMERGENCY)
Facility: MEDICAL CENTER | Age: 62
End: 2017-10-15
Attending: EMERGENCY MEDICINE
Payer: MEDICAID

## 2017-10-15 VITALS
OXYGEN SATURATION: 100 % | HEIGHT: 75 IN | BODY MASS INDEX: 20.15 KG/M2 | HEART RATE: 98 BPM | TEMPERATURE: 97.4 F | SYSTOLIC BLOOD PRESSURE: 105 MMHG | WEIGHT: 162.04 LBS | DIASTOLIC BLOOD PRESSURE: 56 MMHG | RESPIRATION RATE: 14 BRPM

## 2017-10-15 VITALS — SYSTOLIC BLOOD PRESSURE: 121 MMHG | DIASTOLIC BLOOD PRESSURE: 70 MMHG

## 2017-10-15 DIAGNOSIS — R05.9 COUGH: ICD-10-CM

## 2017-10-15 DIAGNOSIS — F25.9 SCHIZOAFFECTIVE DISORDER, UNSPECIFIED TYPE (HCC): ICD-10-CM

## 2017-10-15 DIAGNOSIS — F15.10 METHAMPHETAMINE ABUSE (HCC): ICD-10-CM

## 2017-10-15 PROCEDURE — 99283 EMERGENCY DEPT VISIT LOW MDM: CPT

## 2017-10-15 RX ORDER — AZITHROMYCIN 250 MG/1
TABLET, FILM COATED ORAL
Qty: 6 TAB | Refills: 0 | Status: SHIPPED | OUTPATIENT
Start: 2017-10-15 | End: 2017-10-16

## 2017-10-15 ASSESSMENT — ENCOUNTER SYMPTOMS
HEMOPTYSIS: 0
VOMITING: 0
ABDOMINAL PAIN: 0
SHORTNESS OF BREATH: 0
FALLS: 0
FEVER: 0
SPUTUM PRODUCTION: 1
COUGH: 1
BACK PAIN: 0
NAUSEA: 0
CHILLS: 0

## 2017-10-15 NOTE — ED PROVIDER NOTES
"ED Provider Note    Scribed for Guillermo Zavala M.D. by Ayan Olguin. 10/15/2017, 11:14 AM.    Primary care provider: Pcp Pt States None  Means of arrival: Walk in  History obtained from: Patient  History limited by: None    CHIEF COMPLAINT  Chief Complaint   Patient presents with   • Cough   • Medication Refill     HPI  Regino Robin is a 62 y.o. male who presents to the Emergency Department complaining of yellow productive cough, onset five days. He notes that his symptoms are severe. The patient reports recent speed use via his rectum and smoking. 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 Patient reports that he was seen at [place] yesterday but lost his prescription.  He denies associated fever, nausea, chest pain, or vomiting. No shortness of breath, no chest pain.    REVIEW OF SYSTEMS  Review of Systems   Constitutional: Negative for chills and fever.   HENT: Negative for congestion.    Respiratory: Positive for cough and sputum production. Negative for hemoptysis and shortness of breath.    Cardiovascular: Negative for chest pain.   Gastrointestinal: Negative for abdominal pain, nausea and vomiting.   Musculoskeletal: Negative for back pain and falls.        Positive chronic leg pain   E.      PAST MEDICAL HISTORY   has a past medical history of Schizophrenia (CMS-HCC).    SURGICAL HISTORY   has a past surgical history that includes splenectomy and other orthopedic surgery.    SOCIAL HISTORY  Social History   Substance Use Topics   • Smoking status: Current Every Day Smoker   • Alcohol use Yes      History   Drug Use No     FAMILY HISTORY  No pertinent family history     CURRENT MEDICATIONS  Current Medications can be reviewed in nursing note.     ALLERGIES  No Known Allergies    PHYSICAL EXAM  VITAL SIGNS: /56   Pulse 98   Temp 36.3 °C (97.4 °F)   Resp 14   Ht 1.905 m (6' 3\")   Wt 73.5 kg (162 lb 0.6 oz)   SpO2 100%   BMI 20.25 kg/m² "   Vitals reviewed.  Constitutional: Awake, alert, nontoxic, no acute distress  HENT: Normocephalic, Atraumatic, Bilateral external ears normal, Oropharynx moist, No oral exudates, Nose normal.   Eyes: PERRL, EOMI, Conjunctiva normal, No discharge.   Neck: Normal range of motion, No tenderness, Supple, No stridor.   Cardiovascular: Normal heart rate, Normal rhythm, No murmurs, No rubs, No gallops.   Thorax & Lungs: Normal breath sounds, No respiratory distress, No wheezing  Skin: Warm, Dry, No erythema, No rash.   Musculoskeletal: Good range of motion in all major joints.   Neurologic: Alert, No focal deficits noted.   Psychiatric:Anxious affect.         COURSE & MEDICAL DECISION MAKING  Pertinent Labs & Imaging studies reviewed. (See chart for details)    11:14 AM Patient seen and examined at bedside. The patient presents with a yellow productive cough, and the differential diagnosis includes but is not limited to , bronchitis, pneumonia, URI.  I discussed with the patient the plan to start him on a Z juan.  The patient states that he normally receives a prescription for about because of his splenectomy.  At this point.  His lungs are clear.  He is afebrile, but he does have a bit of a cough.  He does use drugs, but denies IV drugs.  Febrile or toxic appearing.  He'll be treated empirically.  Patient requests a narcotic prescription for his chronic leg pain and was denied. The patient understands the plan and is agreeable.     The patient will return for new or worsening symptoms and is stable at the time of discharge.    The patient is referred to a primary physician for blood pressure management, diabetic screening, and for all other preventative health concerns.    The patient is counseled not to do drugs.    DISPOSITION:  Patient will be discharged home in stable condition.    FOLLOW UP:  Your Physician  Varies    Schedule an appointment as soon as possible for a visit in 2 days        OUTPATIENT  MEDICATIONS:  New Prescriptions    AZITHROMYCIN (ZITHROMAX) 250 MG TAB    Take two tabs by mouth on day one, then one tab by mouth daily on days 2-5.      FINAL IMPRESSION  1. Cough    2. Schizoaffective disorder, unspecified type (CMS-HCC)    3. Methamphetamine abuse      Ayan HURTADO (Scribe), am scribing for, and in the presence of, Guillermo Zavala M.D.    Electronically signed by: Ayan Olguin (Scribe), 10/15/2017    IGuillermo M.D. personally performed the services described in this documentation, as scribed by Ayan Olguin in my presence, and it is both accurate and complete.    The note accurately reflects work and decisions made by me.  Guillermo Zavala  10/15/2017  12:04 PM

## 2017-10-15 NOTE — ED NOTES
Pt discharged home as ordered by erp. Pt instructed to follow up with his PCP and return here as needed. Pt given RX. Pt left ambulating independently

## 2017-10-15 NOTE — ED NOTES
Pt ambulates to triage  Chief Complaint   Patient presents with   • Cough   • Medication Refill   reports he was seen at [facility] yesterday, and lost his prescription for a z pack when he was arrested for doing speed.  Pt asked to wait in lobby, pt updated on triage process and pt asked to inform RN of any changes.

## 2017-10-15 NOTE — DISCHARGE INSTRUCTIONS
Medicines as prescribed.  Follow-up with your doctor or the [facility].    Cough, Adult   A cough is a reflex that helps clear your throat and airways. It can help heal the body or may be a reaction to an irritated airway. A cough may only last 2 or 3 weeks (acute) or may last more than 8 weeks (chronic).   CAUSES  Acute cough:  · Viral or bacterial infections.  Chronic cough:  · Infections.  · Allergies.  · Asthma.  · Post-nasal drip.  · Smoking.  · Heartburn or acid reflux.  · Some medicines.  · Chronic lung problems (COPD).  · Cancer.  SYMPTOMS   · Cough.  · Fever.  · Chest pain.  · Increased breathing rate.  · High-pitched whistling sound when breathing (wheezing).  · Colored mucus that you cough up (sputum).  TREATMENT   · A bacterial cough may be treated with antibiotic medicine.  · A viral cough must run its course and will not respond to antibiotics.  · Your caregiver may recommend other treatments if you have a chronic cough.  HOME CARE INSTRUCTIONS   · Only take over-the-counter or prescription medicines for pain, discomfort, or fever as directed by your caregiver. Use cough suppressants only as directed by your caregiver.  · Use a cold steam vaporizer or humidifier in your bedroom or home to help loosen secretions.  · Sleep in a semi-upright position if your cough is worse at night.  · Rest as needed.  · Stop smoking if you smoke.  SEEK IMMEDIATE MEDICAL CARE IF:   · You have pus in your sputum.  · Your cough starts to worsen.  · You cannot control your cough with suppressants and are losing sleep.  · You begin coughing up blood.  · You have difficulty breathing.  · You develop pain which is getting worse or is uncontrolled with medicine.  · You have a fever.  MAKE SURE YOU:   · Understand these instructions.  · Will watch your condition.  · Will get help right away if you are not doing well or get worse.     This information is not intended to replace advice given to you by your health care provider.  Make sure you discuss any questions you have with your health care provider.     Document Released: 06/15/2012 Document Revised: 03/11/2013 Document Reviewed: 02/24/2016  Elsevier Interactive Patient Education ©2016 Elsevier Inc.

## 2017-10-16 ENCOUNTER — HOSPITAL ENCOUNTER (EMERGENCY)
Facility: MEDICAL CENTER | Age: 62
End: 2017-10-16
Attending: EMERGENCY MEDICINE
Payer: MEDICAID

## 2017-10-16 VITALS
WEIGHT: 158.73 LBS | BODY MASS INDEX: 19.74 KG/M2 | TEMPERATURE: 98.5 F | OXYGEN SATURATION: 98 % | DIASTOLIC BLOOD PRESSURE: 96 MMHG | HEART RATE: 113 BPM | SYSTOLIC BLOOD PRESSURE: 147 MMHG | RESPIRATION RATE: 14 BRPM | HEIGHT: 75 IN

## 2017-10-16 DIAGNOSIS — Z76.0 MEDICATION REFILL: ICD-10-CM

## 2017-10-16 PROCEDURE — 99283 EMERGENCY DEPT VISIT LOW MDM: CPT

## 2017-10-16 RX ORDER — AZITHROMYCIN 250 MG/1
TABLET, FILM COATED ORAL
Qty: 6 TAB | Refills: 0 | Status: SHIPPED | OUTPATIENT
Start: 2017-10-16

## 2017-10-16 ASSESSMENT — ENCOUNTER SYMPTOMS
COUGH: 1
FEVER: 0

## 2017-10-16 NOTE — ED PROVIDER NOTES
"ED Provider Note    ED Provider Note          CHIEF COMPLAINT  Chief Complaint   Patient presents with   • Medication Refill     Pt seen here yesterday. Pt did some speed and arrested last night and lost his prescription.       HPI  Regino Robin is a 62 y.o. male who presents to the Emergency Department For concern of losing his prescription yesterday. He got arrested again while doing speed inside when he got discharged he did not get his prescription back. He has had a cough and was sinus with bronchitis and given a Z-Romulo. He does drugs regularly. He does not have a primary care doctor.    REVIEW OF SYSTEMS  Review of Systems   Constitutional: Negative for fever.   Respiratory: Positive for cough.    Psychiatric/Behavioral: Negative for self-injury.       PAST MEDICAL HISTORY   has a past medical history of Schizophrenia (CMS-[facility]).    SURGICAL HISTORY   has a past surgical history that includes splenectomy and other orthopedic surgery.    SOCIAL HISTORY  Social History   Substance Use Topics   • Smoking status: Current Every Day Smoker   • Smokeless tobacco: Not on file   • Alcohol use Yes      History   Drug Use No       FAMILY HISTORY  No family history on file.    CURRENT MEDICATIONS  Reviewed.  See Encounter Summary.     ALLERGIES  No Known Allergies    PHYSICAL EXAM  VITAL SIGNS: /96   Pulse (!) 113   Temp 36.9 °C (98.5 °F) (Temporal)   Resp 14   Ht 1.905 m (6' 3\")   Wt 72 kg (158 lb 11.7 oz)   SpO2 98%   BMI 19.84 kg/m²   Physical Exam   HENT:   Head: Normocephalic.   Eyes: Pupils are equal, round, and reactive to light.   Neck: Normal range of motion.   Cardiovascular: Regular rhythm.  Tachycardia present.    Pulmonary/Chest: Effort normal.   Neurological: He is alert.   Skin: Skin is warm. He is not diaphoretic.   Psychiatric:   Anxious         .    COURSE & MEDICAL DECISION MAKING  Pertinent Labs & Imaging studies reviewed. (See chart for details)    9:19 AM - Patient seen and examined " "at bedside.   Patient came in emergency Department again after losing his prescription. He said he did eat again and got arrested on a discharge and they did not give his prescription. Upon chart review he is given a Z-Romulo. Patient was requesting a \"another prescription\" I do not notice talking about and therefore I'll just refill his Z-Romulo for him. I explained to him that the ER is not used for this and he does need to take an appointment with a primary care physician. Patient is discharged home      FINAL IMPRESSION  1. Medication refill                 "

## 2017-10-16 NOTE — ED NOTES
"Chief Complaint   Patient presents with   • Medication Refill     Pt seen here yesterday. Pt did some speed and arrested last night and lost his prescription.     Pt is hyperactive speaking rapidly and twitching.   /96   Pulse (!) 113   Temp 36.9 °C (98.5 °F) (Temporal)   Resp 14   Ht 1.905 m (6' 3\")   Wt 72 kg (158 lb 11.7 oz)   SpO2 98%   BMI 19.84 kg/m²   Pt placed back in lobby, educated on triage process, and told to inform staff of any change in condition.     "

## 2017-10-16 NOTE — ED NOTES
PT ambulated to yellow 62, per pt he was arrested and then taken to [facility].  PT reports that when he was dc'd from [facility] he did not receive his paperwork and RX's back from them.  PT had not had them filled yet

## 2017-11-01 ENCOUNTER — HOSPITAL ENCOUNTER (EMERGENCY)
Dept: HOSPITAL 8 - ED | Age: 62
Discharge: HOME | End: 2017-11-01
Payer: MEDICAID

## 2017-11-01 VITALS — HEIGHT: 75 IN | BODY MASS INDEX: 19.43 KG/M2 | WEIGHT: 156.31 LBS

## 2017-11-01 VITALS — DIASTOLIC BLOOD PRESSURE: 74 MMHG | SYSTOLIC BLOOD PRESSURE: 115 MMHG

## 2017-11-01 DIAGNOSIS — B96.89: ICD-10-CM

## 2017-11-01 DIAGNOSIS — I10: ICD-10-CM

## 2017-11-01 DIAGNOSIS — J20.9: Primary | ICD-10-CM

## 2017-11-01 DIAGNOSIS — J44.9: ICD-10-CM

## 2017-11-01 DIAGNOSIS — F17.210: ICD-10-CM

## 2017-11-01 PROCEDURE — 99283 EMERGENCY DEPT VISIT LOW MDM: CPT

## 2021-03-03 DIAGNOSIS — Z23 NEED FOR VACCINATION: ICD-10-CM

## 2021-12-19 NOTE — DISCHARGE INSTRUCTIONS
Chest Wall Pain  Chest wall pain is pain in or around the bones and muscles of your chest. It may take up to 6 weeks to get better. It may take longer if you must stay physically active in your work and activities.   CAUSES   Chest wall pain may happen on its own. However, it may be caused by:  · A viral illness like the flu.  · Injury.  · Coughing.  · Exercise.  · Arthritis.  · Fibromyalgia.  · Shingles.  HOME CARE INSTRUCTIONS   · Avoid overtiring physical activity. Try not to strain or perform activities that cause pain. This includes any activities using your chest or your abdominal and side muscles, especially if heavy weights are used.  · Put ice on the sore area.  ¨ Put ice in a plastic bag.  ¨ Place a towel between your skin and the bag.  ¨ Leave the ice on for 15-20 minutes per hour while awake for the first 2 days.  · Only take over-the-counter or prescription medicines for pain, discomfort, or fever as directed by your caregiver.  SEEK IMMEDIATE MEDICAL CARE IF:   · Your pain increases, or you are very uncomfortable.  · You have a fever.  · Your chest pain becomes worse.  · You have new, unexplained symptoms.  · You have nausea or vomiting.  · You feel sweaty or lightheaded.  · You have a cough with phlegm (sputum), or you cough up blood.  MAKE SURE YOU:   · Understand these instructions.  · Will watch your condition.  · Will get help right away if you are not doing well or get worse.     This information is not intended to replace advice given to you by your health care provider. Make sure you discuss any questions you have with your health care provider.     Document Released: 12/18/2006 Document Revised: 03/11/2013 Document Reviewed: 03/14/2016  [employer] Interactive Patient Education ©2016 [employer]     No Not applicable